# Patient Record
Sex: FEMALE | Race: WHITE | NOT HISPANIC OR LATINO | Employment: FULL TIME | URBAN - METROPOLITAN AREA
[De-identification: names, ages, dates, MRNs, and addresses within clinical notes are randomized per-mention and may not be internally consistent; named-entity substitution may affect disease eponyms.]

---

## 2017-03-21 ENCOUNTER — HOSPITAL ENCOUNTER (OUTPATIENT)
Dept: ONCOLOGY | Facility: CLINIC | Age: 27
Discharge: HOME | End: 2017-03-21
Attending: NURSE PRACTITIONER | Admitting: NURSE PRACTITIONER

## 2017-03-21 LAB
ALBUMIN SERPL-MCNC: 2.7 G/DL (ref 3.5–4.8)
ALBUMIN/GLOB SERPL: 0.7 {RATIO} (ref 1–1.7)
ALP SERPL-CCNC: 45 IU/L (ref 32–91)
ALT SERPL-CCNC: 17 IU/L (ref 14–54)
ANION GAP SERPL CALC-SCNC: 12.9 MMOL/L (ref 10–20)
AST SERPL-CCNC: 23 IU/L (ref 15–41)
BILIRUB SERPL-MCNC: 0.2 MG/DL (ref 0.3–1.2)
BUN SERPL-MCNC: 6 MG/DL (ref 8–20)
BUN/CREAT SERPL: 10 (ref 5.4–26.2)
CALCIUM SERPL-MCNC: 8.1 MG/DL (ref 8.9–10.3)
CHLORIDE SERPL-SCNC: 106 MMOL/L (ref 101–111)
CONV CO2: 18 MMOL/L (ref 22–32)
CONV TOTAL PROTEIN: 6.5 G/DL (ref 6.1–7.9)
CREAT UR-MCNC: 0.6 MG/DL (ref 0.4–1)
GLOBULIN UR ELPH-MCNC: 3.8 G/DL (ref 2.5–3.8)
GLUCOSE SERPL-MCNC: 118 MG/DL (ref 65–99)
MAGNESIUM SERPL-MCNC: 1.9 MG/DL (ref 1.8–2.5)
POTASSIUM SERPL-SCNC: 3.9 MMOL/L (ref 3.6–5.1)
SODIUM SERPL-SCNC: 133 MMOL/L (ref 136–144)

## 2017-03-26 ENCOUNTER — CONVERSION ENCOUNTER (OUTPATIENT)
Dept: URGENT CARE | Facility: CLINIC | Age: 27
End: 2017-03-26

## 2017-06-20 ENCOUNTER — HOSPITAL ENCOUNTER (OUTPATIENT)
Dept: ONCOLOGY | Facility: CLINIC | Age: 27
Discharge: HOME | End: 2017-06-20
Attending: INTERNAL MEDICINE | Admitting: INTERNAL MEDICINE

## 2018-01-23 ENCOUNTER — HOSPITAL ENCOUNTER (OUTPATIENT)
Dept: ONCOLOGY | Facility: HOSPITAL | Age: 28
Discharge: HOME | End: 2018-01-23
Attending: NURSE PRACTITIONER | Admitting: NURSE PRACTITIONER

## 2018-01-23 ENCOUNTER — CLINICAL SUPPORT (OUTPATIENT)
Dept: ONCOLOGY | Facility: HOSPITAL | Age: 28
End: 2018-01-23

## 2018-01-23 ENCOUNTER — HOSPITAL ENCOUNTER (OUTPATIENT)
Dept: ONCOLOGY | Facility: CLINIC | Age: 28
Setting detail: INFUSION SERIES
Discharge: HOME | End: 2018-01-23
Attending: NURSE PRACTITIONER | Admitting: NURSE PRACTITIONER

## 2018-01-23 LAB
ANION GAP SERPL CALC-SCNC: 11.7 MMOL/L (ref 10–20)
BUN SERPL-MCNC: 12 MG/DL (ref 8–20)
BUN/CREAT SERPL: 17.1 (ref 5.4–26.2)
CALCIUM SERPL-MCNC: 9.3 MG/DL (ref 8.9–10.3)
CHLORIDE SERPL-SCNC: 106 MMOL/L (ref 101–111)
CONV CO2: 23 MMOL/L (ref 22–32)
CREAT UR-MCNC: 0.7 MG/DL (ref 0.4–1)
GLUCOSE SERPL-MCNC: 88 MG/DL (ref 65–99)
POTASSIUM SERPL-SCNC: 3.7 MMOL/L (ref 3.6–5.1)
SODIUM SERPL-SCNC: 137 MMOL/L (ref 136–144)

## 2018-01-23 NOTE — PROGRESS NOTES
PATIENTS ONCOLOGY RECORD LOCATED IN [facility]      Subjective     Name:  CARLENE VICTOR     Date:  2018  Address:  [address] [place] [address] [place] [facility]  Home: [phone]  :  1990 AGE:  27 y.o.        RECORDS OBTAINED:  Patients Oncology Record is located in [facility]

## 2018-05-30 ENCOUNTER — HOSPITAL ENCOUNTER (OUTPATIENT)
Dept: ONCOLOGY | Facility: CLINIC | Age: 28
Setting detail: INFUSION SERIES
Discharge: HOME | End: 2018-05-30
Attending: INTERNAL MEDICINE | Admitting: INTERNAL MEDICINE

## 2018-05-30 ENCOUNTER — CLINICAL SUPPORT (OUTPATIENT)
Dept: ONCOLOGY | Facility: HOSPITAL | Age: 28
End: 2018-05-30

## 2018-05-30 NOTE — PROGRESS NOTES
PATIENTS ONCOLOGY RECORD LOCATED IN [facility]      Subjective     Name:  CARLENE VICTOR     Date:  2018  Address:  [address] [place] [address] [place] [facility]  Home: [phone]  :  1990 AGE:  27 y.o.        RECORDS OBTAINED:  Patients Oncology Record is located in [facility]

## 2018-10-07 ENCOUNTER — CONVERSION ENCOUNTER (OUTPATIENT)
Dept: URGENT CARE | Facility: CLINIC | Age: 28
End: 2018-10-07

## 2019-01-29 ENCOUNTER — HOSPITAL ENCOUNTER (OUTPATIENT)
Dept: ONCOLOGY | Facility: HOSPITAL | Age: 29
Discharge: HOME | End: 2019-01-29
Attending: INTERNAL MEDICINE | Admitting: INTERNAL MEDICINE

## 2019-01-29 ENCOUNTER — CLINICAL SUPPORT (OUTPATIENT)
Dept: ONCOLOGY | Facility: HOSPITAL | Age: 29
End: 2019-01-29

## 2019-01-29 ENCOUNTER — HOSPITAL ENCOUNTER (OUTPATIENT)
Dept: ONCOLOGY | Facility: CLINIC | Age: 29
Setting detail: INFUSION SERIES
Discharge: HOME | End: 2019-01-29
Attending: INTERNAL MEDICINE | Admitting: INTERNAL MEDICINE

## 2019-01-29 LAB
ALBUMIN SERPL-MCNC: 4.3 G/DL (ref 3.5–4.8)
ALBUMIN/GLOB SERPL: 1.4 {RATIO} (ref 1–1.7)
ALP SERPL-CCNC: 38 IU/L (ref 32–91)
ALT SERPL-CCNC: 14 IU/L (ref 14–54)
ANION GAP SERPL CALC-SCNC: 10.5 MMOL/L (ref 10–20)
AST SERPL-CCNC: 23 IU/L (ref 15–41)
BILIRUB SERPL-MCNC: 0.5 MG/DL (ref 0.3–1.2)
BUN SERPL-MCNC: 10 MG/DL (ref 8–20)
BUN/CREAT SERPL: 12.5 (ref 5.4–26.2)
CALCIUM SERPL-MCNC: 9.1 MG/DL (ref 8.9–10.3)
CHLORIDE SERPL-SCNC: 108 MMOL/L (ref 101–111)
CONV CO2: 23 MMOL/L (ref 22–32)
CONV TOTAL PROTEIN: 7.3 G/DL (ref 6.1–7.9)
CREAT UR-MCNC: 0.8 MG/DL (ref 0.4–1)
GLOBULIN UR ELPH-MCNC: 3 G/DL (ref 2.5–3.8)
GLUCOSE SERPL-MCNC: 79 MG/DL (ref 65–99)
POTASSIUM SERPL-SCNC: 3.5 MMOL/L (ref 3.6–5.1)
SODIUM SERPL-SCNC: 138 MMOL/L (ref 136–144)

## 2019-01-29 NOTE — PROGRESS NOTES
PATIENTS ONCOLOGY RECORD LOCATED IN [facility]      Subjective     Name:  CARLENE VICTOR     Date:  2019  Address:  [address] [place] [address] [place] [facility]  Home: [unfilled]  :  1990 AGE:  28 y.o.        RECORDS OBTAINED:  Patients Oncology Record is located in [facility]

## 2019-03-20 LAB
EXTERNAL HEPATITIS B SURFACE ANTIGEN: NEGATIVE
EXTERNAL HEPATITIS C AB: NEGATIVE
EXTERNAL RUBELLA QUALITATIVE: NORMAL
EXTERNAL SYPHILIS RPR SCREEN: NORMAL
HIV1 P24 AG SERPL QL IA: NORMAL

## 2019-05-01 ENCOUNTER — HOSPITAL ENCOUNTER (OUTPATIENT)
Dept: GENERAL RADIOLOGY | Facility: HOSPITAL | Age: 29
Discharge: HOME | End: 2019-05-01
Attending: OBSTETRICS & GYNECOLOGY | Admitting: OBSTETRICS & GYNECOLOGY

## 2019-06-04 VITALS
RESPIRATION RATE: 18 BRPM | HEART RATE: 74 BPM | OXYGEN SATURATION: 97 % | OXYGEN SATURATION: 98 % | HEIGHT: 61 IN | WEIGHT: 177 LBS | DIASTOLIC BLOOD PRESSURE: 73 MMHG | HEART RATE: 78 BPM | DIASTOLIC BLOOD PRESSURE: 77 MMHG | SYSTOLIC BLOOD PRESSURE: 119 MMHG | RESPIRATION RATE: 16 BRPM | BODY MASS INDEX: 29.34 KG/M2 | SYSTOLIC BLOOD PRESSURE: 115 MMHG | WEIGHT: 155.4 LBS

## 2019-08-27 ENCOUNTER — TELEPHONE (OUTPATIENT)
Dept: OBSTETRICS AND GYNECOLOGY | Facility: HOSPITAL | Age: 29
End: 2019-08-27

## 2019-08-27 ENCOUNTER — TRANSCRIBE ORDERS (OUTPATIENT)
Dept: ADMINISTRATIVE | Facility: HOSPITAL | Age: 29
End: 2019-08-27

## 2019-08-27 ENCOUNTER — HOSPITAL ENCOUNTER (OUTPATIENT)
Dept: GENERAL RADIOLOGY | Facility: HOSPITAL | Age: 29
Discharge: HOME | End: 2019-08-27
Admitting: OBSTETRICS & GYNECOLOGY

## 2019-08-27 ENCOUNTER — TELEPHONE (OUTPATIENT)
Dept: ONCOLOGY | Facility: CLINIC | Age: 29
End: 2019-08-27

## 2019-08-27 DIAGNOSIS — J06.9 ACUTE UPPER RESPIRATORY INFECTION: Primary | ICD-10-CM

## 2019-08-27 DIAGNOSIS — J06.9 ACUTE UPPER RESPIRATORY INFECTION: ICD-10-CM

## 2019-08-27 PROCEDURE — 71046 X-RAY EXAM CHEST 2 VIEWS: CPT

## 2019-08-27 NOTE — TELEPHONE ENCOUNTER
Ms. Paris left message she needed to schedule follow up appt w/ Dr. Marino.  Returned call, could not leave message as mailbox full.

## 2019-08-27 NOTE — TELEPHONE ENCOUNTER
Called pt to inform her CXR wnl.  See office note from my office for details of today's exam.  Due to cough c more productive sputum over the past few weeks recommend treatment for possible bronchitis.    Rx for Jessica called to [address]    Pt also advised re: other sources of cough/ GI, Cardiac, other pulmonary sources.   Pt voiced understanding, will initiate further workup if abx not effective.

## 2019-08-28 ENCOUNTER — TELEPHONE (OUTPATIENT)
Dept: ENDOCRINOLOGY | Facility: CLINIC | Age: 29
End: 2019-08-28

## 2019-08-28 NOTE — TELEPHONE ENCOUNTER
CALLED AND LM FOR THE PATIENT ABOUT I CAN SCHEDULE HER ON 09/05/2019 AT 8AM ,Thursday  WITH DR SMITH THE SAME DAY AS THE GESTATIONAL CLASS WHICH WILL BE  AT 9AM TO 11AM .THE PATIENT CALLED ME BACK AND CONFRIM SHE WILL BE HERE ON THAT DAY AND TIME.

## 2019-08-28 NOTE — TELEPHONE ENCOUNTER
I CALLED THE PATIENT AND LEFT AN VM ON ON HER CELL TO CALL ME BACK  AND I ALSO CALLED THE HOME NUMBER AND SPOKE WITH  THE PATIENT'S MOM DIMA VICTOR  AND SCHEDULE THE CLASS FOR THE GESTATIONAL CLASS FOR 09/05/2019 AT 9AM -11AM WITH SHIV TORRES AND TOLD HER ABOUT THE  SLOT FOR TODAY FOR DR. FERNANDEZ AT 3:30PM AND TO COME 15 MINS BEFORE HER APPT. SHE SAID SHE WOULD LET HER KNOW ABOUT THE APPT TODAY AND TRY TO MAKE IT. I GAVE  HER MY NUMBER  TO CALL ME JUST IN CASE SHE NEEDS TO RESCHEDULE.

## 2019-08-28 NOTE — TELEPHONE ENCOUNTER
THE PATIENT DID CALL ME BACK AND SHE CAN MAKE THE GESTATIONAL CLASS ON THE 09/05/2019 WITH SHIV TORRES AT 9AM TO 11AM  HOWEVER SHE WILL NOT BE ABLE TO MAKE TODAY'S APPT AT 3:30 PM TODAY WITH DR. FERNANDEZ .THE PATIENT HAS TO WORK LATE SO I TOLD HER I WOULD CALL HER BACK TO SEE IF THERE WERE ANY OPENING BEFORE THE GESTATIONAL CLASS OR AFTER.

## 2019-08-29 ENCOUNTER — TELEPHONE (OUTPATIENT)
Dept: ONCOLOGY | Facility: CLINIC | Age: 29
End: 2019-08-29

## 2019-08-29 NOTE — TELEPHONE ENCOUNTER
Ms. Paris left message she needed to schedule follow up w/ Dr. Marino.  Attempted to return call on number given, but mailbox was full.  Reached patient's mother on home number who had Ms. Paris on the phone.  Scheduled appt for 9/19.

## 2019-09-03 ENCOUNTER — TELEPHONE (OUTPATIENT)
Dept: ONCOLOGY | Facility: CLINIC | Age: 29
End: 2019-09-03

## 2019-09-04 ENCOUNTER — TELEPHONE (OUTPATIENT)
Dept: ENDOCRINOLOGY | Facility: CLINIC | Age: 29
End: 2019-09-04

## 2019-09-04 NOTE — TELEPHONE ENCOUNTER
I CALLED AND TALKED TO THE PATIENT AND REMINDED HER OF HER APPT TOMORROW AT 8:00AM WITH DR. SMITH AND TO ARRIVE 15 MIN BEFORE HER APPT.

## 2019-09-05 ENCOUNTER — OFFICE VISIT (OUTPATIENT)
Dept: ENDOCRINOLOGY | Facility: CLINIC | Age: 29
End: 2019-09-05

## 2019-09-05 ENCOUNTER — LAB (OUTPATIENT)
Dept: LAB | Facility: HOSPITAL | Age: 29
End: 2019-09-05

## 2019-09-05 VITALS
HEART RATE: 85 BPM | SYSTOLIC BLOOD PRESSURE: 130 MMHG | BODY MASS INDEX: 36.25 KG/M2 | OXYGEN SATURATION: 98 % | DIASTOLIC BLOOD PRESSURE: 82 MMHG | HEIGHT: 61 IN | WEIGHT: 192 LBS

## 2019-09-05 DIAGNOSIS — O24.410 DIET CONTROLLED GESTATIONAL DIABETES MELLITUS (GDM) IN THIRD TRIMESTER: ICD-10-CM

## 2019-09-05 DIAGNOSIS — O24.410 DIET CONTROLLED GESTATIONAL DIABETES MELLITUS (GDM) IN THIRD TRIMESTER: Primary | ICD-10-CM

## 2019-09-05 LAB — HBA1C MFR BLD: 5.8 % (ref 3.5–5.6)

## 2019-09-05 PROCEDURE — 36415 COLL VENOUS BLD VENIPUNCTURE: CPT

## 2019-09-05 PROCEDURE — 83036 HEMOGLOBIN GLYCOSYLATED A1C: CPT

## 2019-09-05 PROCEDURE — 99244 OFF/OP CNSLTJ NEW/EST MOD 40: CPT | Performed by: INTERNAL MEDICINE

## 2019-09-05 PROCEDURE — G0108 DIAB MANAGE TRN  PER INDIV: HCPCS | Performed by: DIETITIAN, REGISTERED

## 2019-09-05 RX ORDER — GUAIFENESIN 600 MG/1
1200 TABLET, EXTENDED RELEASE ORAL 2 TIMES DAILY
COMMUNITY
End: 2019-09-26

## 2019-09-05 RX ORDER — LANCETS 28 GAUGE
EACH MISCELLANEOUS
Qty: 300 EACH | Refills: 1 | Status: SHIPPED | OUTPATIENT
Start: 2019-09-05 | End: 2019-11-20

## 2019-09-05 RX ORDER — AMOXICILLIN AND CLAVULANATE POTASSIUM 875; 125 MG/1; MG/1
TABLET, FILM COATED ORAL
COMMUNITY
Start: 2019-09-04 | End: 2019-09-19

## 2019-09-05 RX ORDER — LANCETS 28 GAUGE
EACH MISCELLANEOUS
Qty: 60 EACH | Refills: 5 | Status: SHIPPED | OUTPATIENT
Start: 2019-09-05 | End: 2019-09-05 | Stop reason: SDUPTHER

## 2019-09-05 RX ORDER — BLOOD-GLUCOSE METER
KIT MISCELLANEOUS
Qty: 1 EACH | Refills: 0 | Status: SHIPPED | OUTPATIENT
Start: 2019-09-05 | End: 2019-11-20

## 2019-09-05 RX ORDER — CALCIUM CARBONATE 200(500)MG
1 TABLET,CHEWABLE ORAL DAILY
COMMUNITY
End: 2019-11-01 | Stop reason: HOSPADM

## 2019-09-05 RX ORDER — CODEINE PHOSPHATE AND GUAIFENESIN 10; 100 MG/5ML; MG/5ML
SOLUTION ORAL
Refills: 0 | COMMUNITY
Start: 2019-08-27 | End: 2019-09-05

## 2019-09-05 RX ORDER — AZITHROMYCIN 250 MG/1
TABLET, FILM COATED ORAL
Refills: 0 | COMMUNITY
Start: 2019-08-27 | End: 2019-09-05

## 2019-09-05 NOTE — PROGRESS NOTES
Endocrine Consult Outpatient  Referred by Dr. Oliver for gestational diabetes consultation  Patient Care Team:  Candice Echols APRN as PCP - General  Candice Echols APRN as PCP - Family Medicine     Chief Complaint: Stational diabetes    HPI: 28-year-old female with no significant past medical history, who is currently 31-week pregnant was found to have gestational diabetes in this pregnancy after she failed her initial 1 hour glucose screen and and subsequently underwent 3-hour glucose tolerance test which was abnormal and she is referred here for further evaluation.  She tells me that this is her third pregnancy, her first pregnancy she had a healthy baby at 7 pounds and 1 hours and she did not have gestational diabetes and that pregnancy.  Second pregnancy was ended up in a miscarriage at 8 weeks and this is her third pregnancy at 31-week and she has gained about 35 pounds of weight.  Since she had 3-hour glucose tolerance test which was done on August 22, 2019, she has been working on her diet and has maintained her weight.  She does not have a glucometer therefore she is not checking blood sugars at this time.  She is a scheduled to go through gestational diabetes class later this day.    Old records reviewed: 3-hour glucose tolerance test done on August 22, 2019 reviewed showed a fasting glucose of 109, 1 hour glucose was 221, 2-hour glucose was 1/2/2015 and 3-hour glucose was 144.    Past Medical History:   Diagnosis Date   • Gestational diabetes    • Neutropenia (CMS/[facility])        Social History     Socioeconomic History   • Marital status:      Spouse name: Not on file   • Number of children: Not on file   • Years of education: Not on file   • Highest education level: Not on file   Tobacco Use   • Smoking status: Never Smoker   Substance and Sexual Activity   • Alcohol use: No     Frequency: Never       Family History   Problem Relation Age of Onset   • Diabetes Mother    • Diabetes Maternal  Uncle        Allergies no known allergies    ROS:   Constitutional:  Admit fatigue, tiredness.    Eyes:  Denies change in visual acuity   HENT:  Denies nasal congestion or sore throat   Respiratory: denies cough, admit shortness of breath.   Cardiovascular:  denies chest pain, edema   GI:  Denies abdominal pain, admit nausea, denies vomiting.   :  Denies dysuria   Musculoskeletal:  Denies back pain or joint pain, admit muscle aches  Integument:  Denies dry skin, rash   Neurologic:  Denies headache, focal weakness or sensory changes   Endocrine:  Admit  polyuria and  polydipsia   Psychiatric:  Admit depression and anxiety      Vitals:    09/05/19 0813   BP: 130/82   Pulse: 85   SpO2: 98%        Physical Exam:  GEN: NAD, conversant, Obese  EYES: EOMI, PERRL, no conjunctival erythema  NECK: no thyromegaly, full ROM   CV: RRR, no murmurs/rubs/gallops, no peripheral edema  LUNG: CTAB, no wheezes/rales/ronchi  SKIN: no rashes, no acanthosis  MSK: no deformities, full ROM of all extremities  NEURO: no tremors, DTR normal  PSYCH: AOX3, appropriate mood, affect normal      Results Review:     I reviewed the patient's new clinical results.    Lab Results   Component Value Date    GLUCOSE 79 01/29/2019    BUN 10 01/29/2019    CREATININE 0.8 01/29/2019    BCR 12.5 01/29/2019    K 3.5 (L) 01/29/2019    CO2 23 01/29/2019    CALCIUM 9.1 01/29/2019    ALBUMIN 4.3 01/29/2019    LABIL2 1.4 01/29/2019    AST 23 01/29/2019    ALT 14 01/29/2019     No results found for: HGBA1C  Lab Results   Component Value Date    CREATININE 0.8 01/29/2019     Medication Review: Reviewed.       Current Outpatient Medications:   •  amoxicillin-clavulanate (AUGMENTIN) 875-125 MG per tablet, , Disp: , Rfl:   •  calcium carbonate (TUMS) 500 MG chewable tablet, Chew 1 tablet Daily., Disp: , Rfl:   •  guaiFENesin (MUCINEX) 600 MG 12 hr tablet, Take 1,200 mg by mouth 2 (Two) Times a Day., Disp: , Rfl:     Assessment/Plan   Gestational diabetes mellitus:  "Currently 31-week pregnant.  She is a scheduled for gestational class later today, I have asked her to check blood sugar fasting and 2-hour postprandial, fasting target is less than 90 and 2-hour postprandial target is less than 120.  If she is not able to control her gestational diabetes with diet in the next 1 to 2 weeks then we will consider adding insulin.  She is also advised to check urine ketones in the morning and send all the data on a weekly basis for review.  I will check the A1c.  We will send a glucometer.  See her back in couple weeks.             Telly Gutierrez MD FACE.  06/15/19  4:34 PM      EMR Dragon / transcription disclaimer:     \"Dictated utilizing Dragon dictation\".               "

## 2019-09-05 NOTE — PATIENT INSTRUCTIONS
Please check your blood sugar fasting and 2-hour postprandial and send me the data every week  Please check urine ketones every morning and send with a day to every week  Please check A1c today  Please finish gestational diabetes education today.  Follow-up in 2 weeks.

## 2019-09-13 ENCOUNTER — TELEPHONE (OUTPATIENT)
Dept: ENDOCRINOLOGY | Facility: CLINIC | Age: 29
End: 2019-09-13

## 2019-09-13 RX ORDER — PEN NEEDLE, DIABETIC 30 GX3/16"
1 NEEDLE, DISPOSABLE MISCELLANEOUS NIGHTLY
Qty: 100 EACH | Refills: 1 | Status: SHIPPED | OUTPATIENT
Start: 2019-09-13 | End: 2019-11-20

## 2019-09-13 NOTE — TELEPHONE ENCOUNTER
Spoke with pt on the phone and she is willing to start Levemir tonight 10 units sq and send BG next week. Rx sent

## 2019-09-16 ENCOUNTER — TELEPHONE (OUTPATIENT)
Dept: ONCOLOGY | Facility: CLINIC | Age: 29
End: 2019-09-16

## 2019-09-16 NOTE — TELEPHONE ENCOUNTER
NO RESULT FOR PATIENT FOUND AT [facility]. REFER TO ORDER 1.29.19 FOR REFERRAL IN THE SPRING.

## 2019-09-17 PROBLEM — D70.9 CHRONIC NEUTROPENIA (HCC): Status: ACTIVE | Noted: 2019-09-17

## 2019-09-17 RX ORDER — BLOOD-GLUCOSE METER
KIT MISCELLANEOUS
Refills: 0 | COMMUNITY
Start: 2019-09-05 | End: 2019-10-24 | Stop reason: SDUPTHER

## 2019-09-17 NOTE — PROGRESS NOTES
Hematology/Oncology Outpatient Follow Up    PATIENT NAME:hSikha Paris  :1990  MRN: [id number]  PRIMARY CARE PHYSICIAN: Candice Echols APRN  REFERRING PHYSICIAN: Candice Echols APRN    Chief Complaint   Patient presents with   • Follow-up     for chronic neutropenia      HISTORY OF PRESENT ILLNESS:   1. Chronic neutropenia diagnosis established originally in .  • She claimed to have developed a sore throat and fevers five days prior to her admission at [facility] on 3/15/14.  Her temperature went up to a maximum of 103.9 and she called Dr. Wetzel about this and was told to go to the emergency room if the fever went up again, which she did.  She had preliminary work-up done and was sent home.  Her temperature, however, on day of admission went up to 102 degrees.  She called Dr. Wetzel and was admitted to the hospital.  She claimed to have had a few blisters come up on her lips and the inside of her mouth a few days prior to admission.  She had developed a dry cough which caused her to vomit.  She was found to have a white blood cell count of 3.7 with 22% segs and infectious disease consult was obtained.  She was treated with antibiotics and antivirals and hematology consultation was called.  The patient stated that she was originally diagnosed with neutropenia in  when she was in the eighth grade.  She was living in [place] at that time and was worked up at [facility].  She had a bone marrow done on 04 which was normocellular containing trilateral maturation, rare iron stores, no abnormal infiltrates identified.  Flow cytometry revealed blast marker CD34 seen in 9% of gated cells and 5-6% of the total cells.  Bone marrow was repeated on 3/4/04 which revealed hypocellular marrow with erythroid predominance, 6.7% blasts, decreased neutrophils, eosinophilia and mild dyspoiesis changes of the myeloid and erythroid series.  Iron stores were absent.  The bone marrow was repeated  on 3/29/04 which revealed diluted, markedly hypocellular marrow aspirate.  Bone marrow biopsy showed mildly hypocellular marrow with adequate erythropoiesis and megakaryocytes, mild monocytes and relative eosinophilia but marked granulocytic hypoplasia.  Flow cytometry revealed a mixture of myeloid and lymphoid cells.  Fewer than 2% or cells expressed CD 34.  She was hospitalized at [facility] in July of 2009 and evaluated by Dr. Shaheen Ray.  A bone marrow biopsy was performed which revealed normal appearing megakaryocytes, evidence of normoblastic crythropoiesis and evidence of some myeloid maturation with left shift.  Flow cytometry was negative.  Cytogenetics was normal.  Peripheral blood flow cytometry was also performed which was normal.  • 3/11/4 - Chest x-ray negative.  • 3/12/14 - Flu screen negative.  • 3/15/14 - Chest x-ray revealed asymmetric abnormal small focal infiltrate in the posterior aspect of the right upper lobe of the lung suspicious for pneumonia.  • 3/16/14 - IgM 112 (N), IgA 178 (N), IgG 953 (N), EBV IgM negative, EBV IgG positive, CMV IgG normal, CMV IgM 2 (H).  • 3/17/14 - Peripheral blood flow cytometry normal.  • 4/1/14 - CXR: Complete clearing of the right upper lobe pneumonia.  The chest is return to normal.  Significant improvement from March 15, 2014.  • 4/8/14 - WBC 2.83, ANC 0.26.  • 4/8/14 - CMV IgG 0.1 (N), CMV IgM 0.42 (N).  • 7/15/14 - WBC 3.27, ANC 0.45. Comprehensive metabolic panel with serum creatinine 1.7 (0.6-1.2).   • 1/28/15 - Immunoglobulins normal. Serum creatinine 1.1 mg/dL.   • 9/24/15 - WBC 3.1 with 31% granulocytes, 51% lymphocytes, 18% monocytes, hemoglobin 12.7, platelet count 293,000. Patient has had redness to the right hip area with some pus and painful lymph node in the right groin for two days, taking Keflex through PMD. Neupogen 300 mcg subcu daily and Cipro 500 mg p.o. b.i.d. prescribed.   • 9/25/15 - Wound culture grew 2+ staphylococcus aureus MRSA susceptible  to Tetracycline, Bactrium, Vancomycin. Folate more than 24.8 (5.9-24.8). AGA negative. Vitamin B12 of 263 (180-914).   • 9/28/15 - Hip drainage culture came back positive for MRSA and Keflex and Cipro stopped. Patient started on Bactrim DS 1 p.o. q. 12 hours x14 days.   • 9/30/15 - Patient complained of sharp chest pains. EKG done revealing sinus rhythm with sinus arrhythmia. WBC increased to 9000 with Neupogen discontinued.   • 10/7/15 - WBC 2.8 with 22% granulocytes, 70% lymphocytes, 9% monocytes, hemoglobin 12.4, platelet count 213,000. Resolution of hyperemia and no more drainage from the right hip wound. Chest pains have decreased and are associated with exercise. IgA 165 (), IgG 1100 (600-1500), IgM 117 (). Chest x-ray with no acute cardiopulmonary abnormality.  (). Antineutrophil antibodies negative.   • 12/8/15 - WBC 3.1 with 34% granulocytes, 56% lymphocytes, 10% monocytes, hemoglobin 13, MCV 93.7, platelet count 258,000. Comprehensive metabolic panel with no significant abnormality. ARON screen negative.   • 12/10/15 - CT chest PE protocol with no acute cardiopulmonary abnormality.   • 2/8/16 - WBC 4 with 33% granulocytes, 55% lymphocytes, hemoglobin 13.3, platelet count 653,000. Patient claims to have had recurrent infections with upper respiratory and vaginal infections and is just finishing a course of Flagyl at present. Asked to start on Neupogen 450 mcg subq weekly x4 any time she gets an infection. HSV which showed herpes 1 antibody IgG >8.0 high (<0.9), herpes 2 antibody IgG <0.2 normal (<0.9), herpes simplex1/2 IgM <0.9 normal (<0.9).      • 6/28/16 - White count 4.02, hemoglobin 12.2, MCV 90.7 and platelet count 320,000. ANC is 570.   • 7/12/16 - Patient called the office reporting a skin infection, treated with Ciprofloxacin.   • 9/27/16 - Comprehensive metabolic panel with no significant abnormality. UA negative. Zika virus IgM negative and Zika RNA PCR IgM not detected.  Blood cultures with no growth. CMV IgM 0.27 (<0.91) and CMV IgG 0.1 (<0.9). IgA 167 (), IgG 1090 (600-1500), IgM 128 ().        • 10/4/16 - Blood typing was A positive. RPR non-reactive. Hepatitis B surface antigen non-reactive.   • 11/28/16 - Patient seen by Shiloh Oliver M.D. for second trimester of first pregnancy.   • 12/22/16 - Notified to increase potassium in her diet.   • 3/20/17 - Seen in ER at [facility] for nausea, vomiting and diarrhea. Treated with 1 liter lactated Ringer’s and Zofran. UA suspicious for UTI.  • 3/21/17 - White blood cell count 3.7, ANC 0.94, hemoglobin 12.2, MCV 94.9 and platelet count 249,000. Magnesium 1.9 (1.8-2.5), potassium 3.9 (3.6-5.1), sodium 133 (136-144).      • 6/20/17 - WBC 3.1 with 9% neutrophils, 61% lymphocytes, 24% monocytes, 5% eosinophils, hemoglobin 12.9, platelet count 275,000. Flow cytometry on peripheral blood with no evidence of abnormal myeloid maturation or an increased blast population. No evidence of lymphoproliferative disorder.   • 8/2/17 - Reviewed medication list for side effects causing neutropenia.  Pepcid AC can cause pancytopenia, and leukopenia. Valtrex can cause a decreased neutrophil count.   • 1/29/19 - Discussed possible second opinion at the [facility].   • 8/27/19 - Chest x-ray showed no active disease.  • 9/19/2019 patient 33 weeks pregnant.  Has not made her trip to the [facility] yet.  WBC 3.84 with 20% neutrophils, 53% lymphocytes, 25% monocytes, hemoglobin 10.5, MCV 88.9, platelet count 299,000.    Past Medical History:   Diagnosis Date   • Gestational diabetes    • Neutropenia (CMS/HCC)        Past Surgical History:   Procedure Laterality Date   • TONSILLECTOMY AND ADENOIDECTOMY  1998         Current Outpatient Medications:   •  acetone, urine, test strip, Check urine ketones every day in the morning, Disp: 50 strip, Rfl: 3  •  Blood Glucose Monitoring Suppl (FREESTYLE FREEDOM LITE) w/Device kit, Use as directed  to check glucose, Disp: 1 each, Rfl: 0  •  Blood Glucose Monitoring Suppl (FREESTYLE LITE) device, USE AS DIRECTED TO CHECK GLUCOSE, Disp: , Rfl: 0  •  calcium carbonate (TUMS) 500 MG chewable tablet, Chew 1 tablet Daily., Disp: , Rfl:   •  glucose blood (FREESTYLE TEST STRIPS) test strip, PRN, Disp: 60 each, Rfl: 5  •  guaiFENesin (MUCINEX) 600 MG 12 hr tablet, Take 1,200 mg by mouth 2 (Two) Times a Day., Disp: , Rfl:   •  insulin detemir (LEVEMIR FLEXTOUCH) 100 UNIT/ML injection, Inject 10 Units under the skin into the appropriate area as directed Every Night., Disp: 15 mL, Rfl: 2  •  Insulin Pen Needle (PEN NEEDLES) 32G X 4 MM misc, 1 each Every Night. Use with insulin pen nightly, Disp: 100 each, Rfl: 1  •  Lancets (FREESTYLE) lancets, USE TO TEST BLOOD SUGAR AS NEEDED, Disp: 300 each, Rfl: 1    No Known Allergies    Family History   Problem Relation Age of Onset   • Diabetes Mother    • Diabetes Maternal Uncle        Cancer-related family history is not on file.    Social History     Tobacco Use   • Smoking status: Never Smoker   Substance Use Topics   • Alcohol use: No     Frequency: Never   • Drug use: Not on file       I have reviewed the history of present illness, past medical history, family history, social history, lab results, all notes and other records since the patient was last seen on 1/29/19.     SUBJECTIVE: Patient is here for follow up of chronic neutropenia. Reports to having a chronic cough and will get bronchitis every six to eight months. Has appointment with ENT. Denies any fevers. Is 33 weeks pregnant and is seeing Dr. Shiloh Oliver. Never went to [facility]. Plans on trying to go during her maternity leave or in the Spring.      LISET Willson present during office visit.       REVIEW OF SYSTEMS:  Review of Systems   Constitutional: Negative for chills and fever.   HENT: Negative for ear pain, mouth sores, nosebleeds and sore throat.    Eyes: Negative for photophobia and visual  "disturbance.   Respiratory: Positive for cough. Negative for wheezing and stridor.    Cardiovascular: Negative for chest pain and palpitations.   Gastrointestinal: Negative for abdominal pain, diarrhea, nausea and vomiting.   Endocrine: Negative for cold intolerance and heat intolerance.   Genitourinary: Negative for dysuria and hematuria.   Musculoskeletal: Negative for joint swelling and neck stiffness.   Skin: Negative for color change and rash.   Neurological: Negative for seizures and syncope.   Hematological: Negative for adenopathy.        No obvious bleeding   Psychiatric/Behavioral: Negative for agitation, confusion and hallucinations.       OBJECTIVE:    Vitals:    09/19/19 1534   BP: 95/62  Comment: Pt states that her BP is usually this low   Pulse: 80   Resp: 16   Temp: 98.5 °F (36.9 °C)   Weight: 86.8 kg (191 lb 6.4 oz)   Height: 154.9 cm (61\")   PainSc:   2   PainLoc: Abdomen  Comment: Patient states she's having [place]       ECOG  (0) Fully active, able to carry on all predisease performance without restriction    Physical Exam   Constitutional: She is oriented to person, place, and time. No distress.   HENT:   Head: Normocephalic and atraumatic.   Dental fillings.    Eyes: Conjunctivae and EOM are normal. Right eye exhibits no discharge. Left eye exhibits no discharge. No scleral icterus.   Neck: Normal range of motion. Neck supple. No thyromegaly present.   Cardiovascular: Normal rate, regular rhythm and normal heart sounds. Exam reveals no gallop and no friction rub.   Pulmonary/Chest: Effort normal. No stridor. No respiratory distress. She has no wheezes.   Abdominal: Soft. Bowel sounds are normal. She exhibits no mass. There is no tenderness. There is no rebound and no guarding.   Musculoskeletal: Normal range of motion. She exhibits no tenderness.   Lymphadenopathy:     She has no cervical adenopathy.   Neurological: She is alert and oriented to person, place, and time. She exhibits " normal muscle tone.   Skin: Skin is warm. No rash noted. She is not diaphoretic. No erythema.   Psychiatric: She has a normal mood and affect. Her behavior is normal.   Nursing note and vitals reviewed.      RECENT LABS  WBC   Date Value Ref Range Status   09/19/2019 3.84 3.40 - 10.80 10*3/mm3 Final     RBC   Date Value Ref Range Status   09/19/2019 3.61 (L) 3.77 - 5.28 10*6/mm3 Final     Hemoglobin   Date Value Ref Range Status   09/19/2019 10.5 (L) 12.0 - 15.9 g/dL Final     Hematocrit   Date Value Ref Range Status   09/19/2019 32.1 (L) 34.0 - 46.6 % Final     MCV   Date Value Ref Range Status   09/19/2019 88.9 79.0 - 97.0 fL Final     MCH   Date Value Ref Range Status   09/19/2019 29.1 26.6 - 33.0 pg Final     MCHC   Date Value Ref Range Status   09/19/2019 32.7 31.5 - 35.7 g/dL Final     RDW   Date Value Ref Range Status   09/19/2019 13.2 12.3 - 15.4 % Final     RDW-SD   Date Value Ref Range Status   09/19/2019 41.0 37.0 - 54.0 fl Final     MPV   Date Value Ref Range Status   09/19/2019 8.3 6.0 - 12.0 fL Final     Platelets   Date Value Ref Range Status   09/19/2019 299 140 - 450 10*3/mm3 Final     Neutrophil %   Date Value Ref Range Status   09/19/2019 19.8 (L) 42.7 - 76.0 % Final     Lymphocyte %   Date Value Ref Range Status   09/19/2019 53.4 (H) 19.6 - 45.3 % Final     Monocyte %   Date Value Ref Range Status   09/19/2019 24.7 (H) 5.0 - 12.0 % Final     Eosinophil %   Date Value Ref Range Status   09/19/2019 1.8 0.3 - 6.2 % Final     Basophil %   Date Value Ref Range Status   09/19/2019 0.3 0.0 - 1.5 % Final     Neutrophils, Absolute   Date Value Ref Range Status   09/19/2019 0.76 (L) 1.70 - 7.00 10*3/mm3 Final     Lymphocytes, Absolute   Date Value Ref Range Status   09/19/2019 2.05 0.70 - 3.10 10*3/mm3 Final     Monocytes, Absolute   Date Value Ref Range Status   09/19/2019 0.95 (H) 0.10 - 0.90 10*3/mm3 Final     Eosinophils, Absolute   Date Value Ref Range Status   09/19/2019 0.07 0.00 - 0.40 10*3/mm3  Final     Basophils, Absolute   Date Value Ref Range Status   09/19/2019 0.01 0.00 - 0.20 10*3/mm3 Final     nRBC   Date Value Ref Range Status   05/20/2017 0 0 /100[WBCs] Final       Lab Results   Component Value Date    GLUCOSE 79 01/29/2019    BUN 10 01/29/2019    CREATININE 0.8 01/29/2019    BCR 12.5 01/29/2019    K 3.5 (L) 01/29/2019    CO2 23 01/29/2019    CALCIUM 9.1 01/29/2019    ALBUMIN 4.3 01/29/2019    LABIL2 1.4 01/29/2019    AST 23 01/29/2019    ALT 14 01/29/2019         Assessment/Plan     Chronic neutropenia (CMS/HCC)  - CBC & Differential  - CBC Auto Differential  - IgG  - IgM  - IgA    Anemia, unspecified type  - Ferritin  - Folate  - Haptoglobin  - Iron Profile  - Reticulocytes  - Vitamin B12      ASSESSMENT:  Patient returns to the clinic today after 9 months.  She is 33 weeks pregnant.  She has not been to the [facility].  She is complaining of chronic cough and has had a recent negative chest x-ray.  She has an appointment with ENT and is currently taking insulin for gestational diabetes and is taking Zyrtec.  There are plans to start her on Pepcid and singular by her OB, Shiloh Oliver MD.  She is on prenatals but has become anemic.      PLAN:  Check iron profile, ferritin, retic count, folate, Vitamin B12 and haptoglobin today.    Immunoglobulins.         I have reviewed lab results, imaging, vitals, and medications with the patient today. Will follow up in 1 month.     Patient verbalized understanding and is in agreement of the above plan.    I have reviewed and agree with the above information.   Neftaly Marino M.D., F.A.C.P.    Much of the above report is an electronic transcription/translation of the spoken language to printed text using Dragon Software. As such, the subtleties and finesse of the spoken language may permit erroneous, or at times, nonsensical words or phrases to be inadvertently transcribed; thus changes may be made at a later date to rectify these errors.

## 2019-09-18 DIAGNOSIS — D70.9 CHRONIC NEUTROPENIA (HCC): Primary | ICD-10-CM

## 2019-09-19 ENCOUNTER — OFFICE VISIT (OUTPATIENT)
Dept: ONCOLOGY | Facility: CLINIC | Age: 29
End: 2019-09-19

## 2019-09-19 ENCOUNTER — APPOINTMENT (OUTPATIENT)
Dept: LAB | Facility: HOSPITAL | Age: 29
End: 2019-09-19

## 2019-09-19 VITALS
SYSTOLIC BLOOD PRESSURE: 95 MMHG | TEMPERATURE: 98.5 F | BODY MASS INDEX: 36.14 KG/M2 | DIASTOLIC BLOOD PRESSURE: 62 MMHG | HEIGHT: 61 IN | WEIGHT: 191.4 LBS | HEART RATE: 80 BPM | RESPIRATION RATE: 16 BRPM

## 2019-09-19 DIAGNOSIS — D70.9 CHRONIC NEUTROPENIA (HCC): Primary | ICD-10-CM

## 2019-09-19 DIAGNOSIS — D64.9 ANEMIA, UNSPECIFIED TYPE: ICD-10-CM

## 2019-09-19 LAB
BASOPHILS # BLD AUTO: 0.01 10*3/MM3 (ref 0–0.2)
BASOPHILS NFR BLD AUTO: 0.3 % (ref 0–1.5)
DEPRECATED RDW RBC AUTO: 41 FL (ref 37–54)
EOSINOPHIL # BLD AUTO: 0.07 10*3/MM3 (ref 0–0.4)
EOSINOPHIL NFR BLD AUTO: 1.8 % (ref 0.3–6.2)
ERYTHROCYTE [DISTWIDTH] IN BLOOD BY AUTOMATED COUNT: 13.2 % (ref 12.3–15.4)
FERRITIN SERPL-MCNC: 7 NG/ML (ref 11–307)
FOLATE SERPL-MCNC: 16.5 NG/ML (ref 5.9–24.8)
HCT VFR BLD AUTO: 32.1 % (ref 34–46.6)
HGB BLD-MCNC: 10.5 G/DL (ref 12–15.9)
IRON 24H UR-MRATE: 31 MCG/DL (ref 28–170)
IRON SATN MFR SERPL: 6 % (ref 15–50)
LYMPHOCYTES # BLD AUTO: 2.05 10*3/MM3 (ref 0.7–3.1)
LYMPHOCYTES NFR BLD AUTO: 53.4 % (ref 19.6–45.3)
MCH RBC QN AUTO: 29.1 PG (ref 26.6–33)
MCHC RBC AUTO-ENTMCNC: 32.7 G/DL (ref 31.5–35.7)
MCV RBC AUTO: 88.9 FL (ref 79–97)
MONOCYTES # BLD AUTO: 0.95 10*3/MM3 (ref 0.1–0.9)
MONOCYTES NFR BLD AUTO: 24.7 % (ref 5–12)
NEUTROPHILS # BLD AUTO: 0.76 10*3/MM3 (ref 1.7–7)
NEUTROPHILS NFR BLD AUTO: 19.8 % (ref 42.7–76)
PLATELET # BLD AUTO: 299 10*3/MM3 (ref 140–450)
PMV BLD AUTO: 8.3 FL (ref 6–12)
RBC # BLD AUTO: 3.61 10*6/MM3 (ref 3.77–5.28)
RETICS # AUTO: 0.07 10*6/MM3 (ref 0.02–0.13)
RETICS/RBC NFR AUTO: 1.91 % (ref 0.7–1.9)
TIBC SERPL-MCNC: 542 MCG/DL (ref 228–428)
TRANSFERRIN SERPL-MCNC: 387 MG/DL (ref 190–380)
VIT B12 BLD-MCNC: 139 PG/ML (ref 180–914)
WBC NRBC COR # BLD: 3.84 10*3/MM3 (ref 3.4–10.8)

## 2019-09-19 PROCEDURE — 82607 VITAMIN B-12: CPT | Performed by: INTERNAL MEDICINE

## 2019-09-19 PROCEDURE — 99214 OFFICE O/P EST MOD 30 MIN: CPT | Performed by: INTERNAL MEDICINE

## 2019-09-19 PROCEDURE — 85025 COMPLETE CBC W/AUTO DIFF WBC: CPT | Performed by: INTERNAL MEDICINE

## 2019-09-19 PROCEDURE — 84466 ASSAY OF TRANSFERRIN: CPT | Performed by: INTERNAL MEDICINE

## 2019-09-19 PROCEDURE — 85045 AUTOMATED RETICULOCYTE COUNT: CPT | Performed by: INTERNAL MEDICINE

## 2019-09-19 PROCEDURE — 83010 ASSAY OF HAPTOGLOBIN QUANT: CPT | Performed by: INTERNAL MEDICINE

## 2019-09-19 PROCEDURE — 82728 ASSAY OF FERRITIN: CPT | Performed by: INTERNAL MEDICINE

## 2019-09-19 PROCEDURE — 82746 ASSAY OF FOLIC ACID SERUM: CPT | Performed by: INTERNAL MEDICINE

## 2019-09-19 PROCEDURE — 36415 COLL VENOUS BLD VENIPUNCTURE: CPT | Performed by: INTERNAL MEDICINE

## 2019-09-19 PROCEDURE — 83540 ASSAY OF IRON: CPT | Performed by: INTERNAL MEDICINE

## 2019-09-19 PROCEDURE — 82784 ASSAY IGA/IGD/IGG/IGM EACH: CPT | Performed by: INTERNAL MEDICINE

## 2019-09-20 LAB
HAPTOGLOB SERPL-MCNC: 125 MG/DL (ref 36–195)
IGA1 MFR SER: 163 MG/DL (ref 50–400)
IGG1 SER-MCNC: 877 MG/DL (ref 600–1500)
IGM SERPL-MCNC: 105 MG/DL (ref 40–300)

## 2019-09-20 RX ORDER — INSULIN DETEMIR 100 [IU]/ML
INJECTION, SOLUTION SUBCUTANEOUS
Qty: 2 PEN | Refills: 2 | Status: SHIPPED | OUTPATIENT
Start: 2019-09-20 | End: 2019-10-03

## 2019-09-23 ENCOUNTER — TELEPHONE (OUTPATIENT)
Dept: ONCOLOGY | Facility: CLINIC | Age: 29
End: 2019-09-23

## 2019-09-23 DIAGNOSIS — E61.1 IRON DEFICIENCY: ICD-10-CM

## 2019-09-23 DIAGNOSIS — E53.8 B12 DEFICIENCY: Primary | ICD-10-CM

## 2019-09-23 RX ORDER — FERROUS SULFATE 325(65) MG
325 TABLET ORAL 2 TIMES DAILY
Qty: 60 TABLET | Refills: 3 | Status: SHIPPED | OUTPATIENT
Start: 2019-09-23 | End: 2019-11-01 | Stop reason: HOSPADM

## 2019-09-23 NOTE — TELEPHONE ENCOUNTER
Patient returned call.  Discussed lab results and orders.  Patient v/u.  Advised that I would have someone call to schedule her injections and that the iron has been sent to her pharmacy.  Patient v/u.  She stated that she will clear the injections with her OB prior to starting.

## 2019-09-23 NOTE — TELEPHONE ENCOUNTER
----- Message from Neftaly Marino MD sent at 9/20/2019 10:03 AM EDT -----  Vitamin B12 level 139.  Antiparietal cell and intrinsic factor antibodies ordered and vitamin B12 1000 mcg IM weekly x8 followed by monthly ordered.

## 2019-09-25 ENCOUNTER — TELEPHONE (OUTPATIENT)
Dept: ENDOCRINOLOGY | Facility: CLINIC | Age: 29
End: 2019-09-25

## 2019-09-26 ENCOUNTER — OFFICE VISIT (OUTPATIENT)
Dept: ENDOCRINOLOGY | Facility: CLINIC | Age: 29
End: 2019-09-26

## 2019-09-26 VITALS
OXYGEN SATURATION: 97 % | HEIGHT: 62 IN | HEART RATE: 89 BPM | BODY MASS INDEX: 34.96 KG/M2 | SYSTOLIC BLOOD PRESSURE: 118 MMHG | DIASTOLIC BLOOD PRESSURE: 65 MMHG | WEIGHT: 190 LBS

## 2019-09-26 DIAGNOSIS — E66.09 CLASS 1 OBESITY DUE TO EXCESS CALORIES WITHOUT SERIOUS COMORBIDITY WITH BODY MASS INDEX (BMI) OF 34.0 TO 34.9 IN ADULT: ICD-10-CM

## 2019-09-26 DIAGNOSIS — O24.414 INSULIN CONTROLLED GESTATIONAL DIABETES MELLITUS (GDM) IN THIRD TRIMESTER: Primary | ICD-10-CM

## 2019-09-26 PROCEDURE — 99213 OFFICE O/P EST LOW 20 MIN: CPT | Performed by: INTERNAL MEDICINE

## 2019-09-26 NOTE — PATIENT INSTRUCTIONS
Continue Levemir 11 units at bedtime  Please send blood sugar records on a weekly basis for review  Please check A1c in 2 weeks and follow-up in 4 weeks.

## 2019-09-26 NOTE — PROGRESS NOTES
Endocrine Progress Note Outpatient     Patient Care Team:  Candice Echols APRN as PCP - General  Candice Echols APRN as PCP - Family Medicine    Chief Complaint: Follow-up gestational diabetes    HPI: 8-year-old female who is currently 34-week pregnant is here for follow-up of gestational diabetes.  She is currently on Levemir units at bedtime.  She unfortunately forgot to bring blood sugar records for review but tells me the morning sugars are less than 100 and during the day she runs less than 120 most of the time.  She is following a diet and she has done gestational class.  Baby is doing okay.  She is feeling better since she started following her diet.    Past Medical History:   Diagnosis Date   • Gestational diabetes    • Neutropenia (CMS/HCC)    • Vitamin B deficiency        Social History     Socioeconomic History   • Marital status:      Spouse name: Not on file   • Number of children: Not on file   • Years of education: Not on file   • Highest education level: Not on file   Tobacco Use   • Smoking status: Never Smoker   Substance and Sexual Activity   • Alcohol use: No     Frequency: Never       Family History   Problem Relation Age of Onset   • Diabetes Mother    • Diabetes Maternal Uncle        No Known Allergies    ROS:   Constitutional:  Denies fatigue, tiredness.    Eyes:  Denies change in visual acuity   HENT:  Denies nasal congestion or sore throat   Respiratory: denies cough, shortness of breath.   Cardiovascular:  denies chest pain, edema   GI:  Denies abdominal pain, nausea, vomiting.   Musculoskeletal:  Denies back pain or joint pain   Integument:  Denies dry skin and rash   Neurologic:  Denies headache, focal weakness or sensory changes   Endocrine:  Denies polyuria or polydipsia   Psychiatric:  Denies depression or anxiety      Vitals:    09/26/19 0857   BP: 118/65   Pulse: 89   SpO2: 97%       Physical Exam:  GEN: NAD, conversant, Obesity  EYES: EOMI, PERRL, no conjunctival  erythema  NECK: no thyromegaly, full ROM   CV: RRR, no murmurs/rubs/gallops, no peripheral edema  LUNG: CTAB, no wheezes/rales/ronchi  SKIN: no rashes, no acanthosis  MSK: no deformities, full ROM of all extremities  NEURO: no tremors, DTR normal  PSYCH: AOX3, appropriate mood, affect normal      Results Review:     I reviewed the patient's new clinical results.    Lab Results   Component Value Date    HGBA1C 5.8 (H) 09/05/2019      Lab Results   Component Value Date    GLUCOSE 79 01/29/2019    BUN 10 01/29/2019    CREATININE 0.8 01/29/2019    BCR 12.5 01/29/2019    K 3.5 (L) 01/29/2019    CO2 23 01/29/2019    CALCIUM 9.1 01/29/2019    ALBUMIN 4.3 01/29/2019    LABIL2 1.4 01/29/2019    AST 23 01/29/2019    ALT 14 01/29/2019          Medication Review: Reviewed.       Current Outpatient Medications:   •  acetone, urine, test strip, Check urine ketones every day in the morning, Disp: 50 strip, Rfl: 3  •  Blood Glucose Monitoring Suppl (FREESTYLE FREEDOM LITE) w/Device kit, Use as directed to check glucose, Disp: 1 each, Rfl: 0  •  Blood Glucose Monitoring Suppl (FREESTYLE LITE) device, USE AS DIRECTED TO CHECK GLUCOSE, Disp: , Rfl: 0  •  calcium carbonate (TUMS) 500 MG chewable tablet, Chew 1 tablet Daily., Disp: , Rfl:   •  ferrous sulfate 325 (65 FE) MG tablet, Take 1 tablet by mouth 2 (Two) Times a Day., Disp: 60 tablet, Rfl: 3  •  glucose blood (FREESTYLE TEST STRIPS) test strip, PRN, Disp: 60 each, Rfl: 5  •  Insulin Pen Needle (PEN NEEDLES) 32G X 4 MM misc, 1 each Every Night. Use with insulin pen nightly, Disp: 100 each, Rfl: 1  •  Lancets (FREESTYLE) lancets, USE TO TEST BLOOD SUGAR AS NEEDED, Disp: 300 each, Rfl: 1  •  LEVEMIR FLEXTOUCH 100 UNIT/ML injection, Pt to inject SQ 11 units every bedtime, Disp: 2 pen, Rfl: 2      Assessment/Plan   1.  Gestational diabetes mellitus: Well controlled with insulin and diet.  At this time will continue Levemir 11 units at bedtime and she is advised to send blood sugar  records for review on a weekly basis.  Last A1c was 5.8% on September 5, 2019.  I will check A1c next month.  2.  Obesity: She is already working on her diet her weight is trending down, advised and encouraged to continue to follow diet.            Telly Gutierrez MD FACE.    Much of the above report is an electronic transcription/translation of the spoken language to printed text using Dragon Software. As such, the subtleties and finesse of the spoken language may permit erroneous, or at times, nonsensical words or phrases to be inadvertently transcribed; thus changes may be made at a later date to rectify these errors.

## 2019-09-27 ENCOUNTER — TELEPHONE (OUTPATIENT)
Dept: ENDOCRINOLOGY | Facility: CLINIC | Age: 29
End: 2019-09-27

## 2019-09-27 NOTE — TELEPHONE ENCOUNTER
Responded to email from patient saying she lost her BG records. Advised to send new records next week  . Email scanned to media

## 2019-10-01 ENCOUNTER — HOSPITAL ENCOUNTER (OUTPATIENT)
Dept: ONCOLOGY | Facility: HOSPITAL | Age: 29
Discharge: HOME | End: 2019-10-01
Admitting: INTERNAL MEDICINE

## 2019-10-01 ENCOUNTER — TELEPHONE (OUTPATIENT)
Dept: ENDOCRINOLOGY | Facility: CLINIC | Age: 29
End: 2019-10-01

## 2019-10-01 VITALS
SYSTOLIC BLOOD PRESSURE: 130 MMHG | TEMPERATURE: 98.1 F | DIASTOLIC BLOOD PRESSURE: 64 MMHG | HEIGHT: 62 IN | RESPIRATION RATE: 18 BRPM | WEIGHT: 192 LBS | BODY MASS INDEX: 35.33 KG/M2 | HEART RATE: 64 BPM

## 2019-10-01 DIAGNOSIS — E53.8 B12 DEFICIENCY: Primary | ICD-10-CM

## 2019-10-01 PROCEDURE — 25010000002 CYANOCOBALAMIN PER 1000 MCG: Performed by: INTERNAL MEDICINE

## 2019-10-01 PROCEDURE — 96372 THER/PROPH/DIAG INJ SC/IM: CPT | Performed by: INTERNAL MEDICINE

## 2019-10-01 RX ORDER — CYANOCOBALAMIN 1000 UG/ML
1000 INJECTION, SOLUTION INTRAMUSCULAR; SUBCUTANEOUS ONCE
Status: COMPLETED | OUTPATIENT
Start: 2019-10-01 | End: 2019-10-01

## 2019-10-01 RX ADMIN — CYANOCOBALAMIN 1000 MCG: 1000 INJECTION, SOLUTION INTRAMUSCULAR; SUBCUTANEOUS at 14:43

## 2019-10-03 RX ORDER — INSULIN DETEMIR 100 [IU]/ML
INJECTION, SOLUTION SUBCUTANEOUS
Qty: 2 PEN | Refills: 2 | Status: SHIPPED | OUTPATIENT
Start: 2019-10-03 | End: 2019-10-14 | Stop reason: SDUPTHER

## 2019-10-09 ENCOUNTER — LAB (OUTPATIENT)
Dept: LAB | Facility: HOSPITAL | Age: 29
End: 2019-10-09

## 2019-10-09 ENCOUNTER — HOSPITAL ENCOUNTER (OUTPATIENT)
Dept: ONCOLOGY | Facility: HOSPITAL | Age: 29
Discharge: HOME | End: 2019-10-09
Admitting: INTERNAL MEDICINE

## 2019-10-09 VITALS
TEMPERATURE: 98.2 F | RESPIRATION RATE: 18 BRPM | DIASTOLIC BLOOD PRESSURE: 72 MMHG | SYSTOLIC BLOOD PRESSURE: 130 MMHG | WEIGHT: 194 LBS | HEART RATE: 74 BPM | BODY MASS INDEX: 35.7 KG/M2 | HEIGHT: 62 IN

## 2019-10-09 DIAGNOSIS — E66.09 CLASS 1 OBESITY DUE TO EXCESS CALORIES WITHOUT SERIOUS COMORBIDITY WITH BODY MASS INDEX (BMI) OF 34.0 TO 34.9 IN ADULT: ICD-10-CM

## 2019-10-09 DIAGNOSIS — E53.8 B12 DEFICIENCY: Primary | ICD-10-CM

## 2019-10-09 DIAGNOSIS — O24.414 INSULIN CONTROLLED GESTATIONAL DIABETES MELLITUS (GDM) IN THIRD TRIMESTER: ICD-10-CM

## 2019-10-09 DIAGNOSIS — E53.8 B12 DEFICIENCY: ICD-10-CM

## 2019-10-09 LAB
EXTERNAL GROUP B STREP ANTIGEN: NEGATIVE
HBA1C MFR BLD: 5.5 % (ref 3.5–5.6)

## 2019-10-09 PROCEDURE — 25010000002 CYANOCOBALAMIN PER 1000 MCG: Performed by: INTERNAL MEDICINE

## 2019-10-09 PROCEDURE — 96372 THER/PROPH/DIAG INJ SC/IM: CPT | Performed by: INTERNAL MEDICINE

## 2019-10-09 PROCEDURE — 36415 COLL VENOUS BLD VENIPUNCTURE: CPT

## 2019-10-09 PROCEDURE — 83036 HEMOGLOBIN GLYCOSYLATED A1C: CPT

## 2019-10-09 RX ORDER — CYANOCOBALAMIN 1000 UG/ML
1000 INJECTION, SOLUTION INTRAMUSCULAR; SUBCUTANEOUS ONCE
Status: CANCELLED | OUTPATIENT
Start: 2019-10-09

## 2019-10-09 RX ORDER — CYANOCOBALAMIN 1000 UG/ML
1000 INJECTION, SOLUTION INTRAMUSCULAR; SUBCUTANEOUS ONCE
Status: COMPLETED | OUTPATIENT
Start: 2019-10-09 | End: 2019-10-09

## 2019-10-09 RX ADMIN — CYANOCOBALAMIN 1000 MCG: 1000 INJECTION, SOLUTION INTRAMUSCULAR; SUBCUTANEOUS at 11:50

## 2019-10-14 RX ORDER — INSULIN DETEMIR 100 [IU]/ML
INJECTION, SOLUTION SUBCUTANEOUS
Qty: 2 PEN | Refills: 2 | Status: SHIPPED | OUTPATIENT
Start: 2019-10-14 | End: 2019-10-24

## 2019-10-15 DIAGNOSIS — E53.8 B12 DEFICIENCY: ICD-10-CM

## 2019-10-15 RX ORDER — CYANOCOBALAMIN 1000 UG/ML
1000 INJECTION, SOLUTION INTRAMUSCULAR; SUBCUTANEOUS ONCE
Status: CANCELLED | OUTPATIENT
Start: 2019-10-16

## 2019-10-16 ENCOUNTER — HOSPITAL ENCOUNTER (OUTPATIENT)
Dept: ONCOLOGY | Facility: HOSPITAL | Age: 29
Discharge: HOME | End: 2019-10-16
Admitting: NURSE PRACTITIONER

## 2019-10-16 ENCOUNTER — OFFICE VISIT (OUTPATIENT)
Dept: ONCOLOGY | Facility: CLINIC | Age: 29
End: 2019-10-16

## 2019-10-16 ENCOUNTER — APPOINTMENT (OUTPATIENT)
Dept: LAB | Facility: HOSPITAL | Age: 29
End: 2019-10-16

## 2019-10-16 VITALS
TEMPERATURE: 98.2 F | WEIGHT: 194 LBS | SYSTOLIC BLOOD PRESSURE: 104 MMHG | BODY MASS INDEX: 35.7 KG/M2 | DIASTOLIC BLOOD PRESSURE: 66 MMHG | HEART RATE: 67 BPM | RESPIRATION RATE: 18 BRPM | HEIGHT: 62 IN

## 2019-10-16 DIAGNOSIS — E53.8 B12 DEFICIENCY: ICD-10-CM

## 2019-10-16 DIAGNOSIS — D70.9 CHRONIC NEUTROPENIA (HCC): Primary | ICD-10-CM

## 2019-10-16 DIAGNOSIS — D64.9 ANEMIA, UNSPECIFIED TYPE: ICD-10-CM

## 2019-10-16 DIAGNOSIS — E53.8 B12 DEFICIENCY: Primary | ICD-10-CM

## 2019-10-16 LAB
BASOPHILS # BLD AUTO: 0.02 10*3/MM3 (ref 0–0.2)
BASOPHILS NFR BLD AUTO: 0.5 % (ref 0–1.5)
DEPRECATED RDW RBC AUTO: 48.8 FL (ref 37–54)
EOSINOPHIL # BLD AUTO: 0.05 10*3/MM3 (ref 0–0.4)
EOSINOPHIL NFR BLD AUTO: 1.2 % (ref 0.3–6.2)
ERYTHROCYTE [DISTWIDTH] IN BLOOD BY AUTOMATED COUNT: 15.4 % (ref 12.3–15.4)
HCT VFR BLD AUTO: 35.2 % (ref 34–46.6)
HGB BLD-MCNC: 11.4 G/DL (ref 12–15.9)
LYMPHOCYTES # BLD AUTO: 2.37 10*3/MM3 (ref 0.7–3.1)
LYMPHOCYTES NFR BLD AUTO: 58.1 % (ref 19.6–45.3)
MCH RBC QN AUTO: 28.6 PG (ref 26.6–33)
MCHC RBC AUTO-ENTMCNC: 32.4 G/DL (ref 31.5–35.7)
MCV RBC AUTO: 88.2 FL (ref 79–97)
MONOCYTES # BLD AUTO: 1.09 10*3/MM3 (ref 0.1–0.9)
MONOCYTES NFR BLD AUTO: 26.7 % (ref 5–12)
NEUTROPHILS # BLD AUTO: 0.55 10*3/MM3 (ref 1.7–7)
NEUTROPHILS NFR BLD AUTO: 13.5 % (ref 42.7–76)
PLATELET # BLD AUTO: 249 10*3/MM3 (ref 140–450)
PMV BLD AUTO: 8.6 FL (ref 6–12)
RBC # BLD AUTO: 3.99 10*6/MM3 (ref 3.77–5.28)
WBC NRBC COR # BLD: 4.08 10*3/MM3 (ref 3.4–10.8)

## 2019-10-16 PROCEDURE — 99214 OFFICE O/P EST MOD 30 MIN: CPT | Performed by: INTERNAL MEDICINE

## 2019-10-16 PROCEDURE — 85025 COMPLETE CBC W/AUTO DIFF WBC: CPT | Performed by: INTERNAL MEDICINE

## 2019-10-16 PROCEDURE — 25010000002 CYANOCOBALAMIN PER 1000 MCG: Performed by: INTERNAL MEDICINE

## 2019-10-16 PROCEDURE — 36415 COLL VENOUS BLD VENIPUNCTURE: CPT | Performed by: INTERNAL MEDICINE

## 2019-10-16 PROCEDURE — 96372 THER/PROPH/DIAG INJ SC/IM: CPT | Performed by: INTERNAL MEDICINE

## 2019-10-16 RX ORDER — LANOLIN ALCOHOL/MO/W.PET/CERES
1000 CREAM (GRAM) TOPICAL DAILY
COMMUNITY
End: 2019-11-01 | Stop reason: HOSPADM

## 2019-10-16 RX ORDER — FAMOTIDINE 20 MG/1
20 TABLET, FILM COATED ORAL 2 TIMES DAILY
COMMUNITY
End: 2019-10-24

## 2019-10-16 RX ORDER — CYANOCOBALAMIN 1000 UG/ML
1000 INJECTION, SOLUTION INTRAMUSCULAR; SUBCUTANEOUS ONCE
Status: COMPLETED | OUTPATIENT
Start: 2019-10-16 | End: 2019-10-16

## 2019-10-16 RX ADMIN — CYANOCOBALAMIN 1000 MCG: 1000 INJECTION, SOLUTION INTRAMUSCULAR; SUBCUTANEOUS at 16:53

## 2019-10-16 NOTE — PROGRESS NOTES
Hematology/Oncology Outpatient Follow Up    PATIENT NAME:Shikha Paris  :1990  MRN: [id number]  PRIMARY CARE PHYSICIAN: Candice Echols APRN  REFERRING PHYSICIAN: Candice Echols APRN    Chief Complaint   Patient presents with   • Follow-up     for chronic neutropenia      HISTORY OF PRESENT ILLNESS:   1. Chronic neutropenia diagnosis established originally in .  • She claimed to have developed a sore throat and fevers five days prior to her admission at [facility] on 3/15/14.  Her temperature went up to a maximum of 103.9 and she called Dr. Wetzel about this and was told to go to the emergency room if the fever went up again, which she did.  She had preliminary work-up done and was sent home.  Her temperature, however, on day of admission went up to 102 degrees.  She called Dr. Wetzel and was admitted to the hospital.  She claimed to have had a few blisters come up on her lips and the inside of her mouth a few days prior to admission.  She had developed a dry cough which caused her to vomit.  She was found to have a white blood cell count of 3.7 with 22% segs and infectious disease consult was obtained.  She was treated with antibiotics and antivirals and hematology consultation was called.  The patient stated that she was originally diagnosed with neutropenia in  when she was in the eighth grade.  She was living in [place] at that time and was worked up at [facility].  She had a bone marrow done on 04 which was normocellular containing trilateral maturation, rare iron stores, no abnormal infiltrates identified.  Flow cytometry revealed blast marker CD34 seen in 9% of gated cells and 5-6% of the total cells.  Bone marrow was repeated on 3/4/04 which revealed hypocellular marrow with erythroid predominance, 6.7% blasts, decreased neutrophils, eosinophilia and mild dyspoiesis changes of the myeloid and erythroid series.  Iron stores were absent.  The bone marrow was repeated  on 3/29/04 which revealed diluted, markedly hypocellular marrow aspirate.  Bone marrow biopsy showed mildly hypocellular marrow with adequate erythropoiesis and megakaryocytes, mild monocytes and relative eosinophilia but marked granulocytic hypoplasia.  Flow cytometry revealed a mixture of myeloid and lymphoid cells.  Fewer than 2% or cells expressed CD 34.  She was hospitalized at [facility] in July of 2009 and evaluated by Dr. Shaheen Ray.  A bone marrow biopsy was performed which revealed normal appearing megakaryocytes, evidence of normoblastic crythropoiesis and evidence of some myeloid maturation with left shift.  Flow cytometry was negative.  Cytogenetics was normal.  Peripheral blood flow cytometry was also performed which was normal.  • 3/11/4 - Chest x-ray negative.  • 3/12/14 - Flu screen negative.  • 3/15/14 - Chest x-ray revealed asymmetric abnormal small focal infiltrate in the posterior aspect of the right upper lobe of the lung suspicious for pneumonia.  • 3/16/14 - IgM 112 (N), IgA 178 (N), IgG 953 (N), EBV IgM negative, EBV IgG positive, CMV IgG normal, CMV IgM 2 (H).  • 3/17/14 - Peripheral blood flow cytometry normal.  • 4/1/14 - CXR: Complete clearing of the right upper lobe pneumonia.  The chest is return to normal.  Significant improvement from March 15, 2014.  • 4/8/14 - WBC 2.83, ANC 0.26.  • 4/8/14 - CMV IgG 0.1 (N), CMV IgM 0.42 (N).  • 7/15/14 - WBC 3.27, ANC 0.45. Comprehensive metabolic panel with serum creatinine 1.7 (0.6-1.2).   • 1/28/15 - Immunoglobulins normal. Serum creatinine 1.1 mg/dL.   • 9/24/15 - WBC 3.1 with 31% granulocytes, 51% lymphocytes, 18% monocytes, hemoglobin 12.7, platelet count 293,000. Patient has had redness to the right hip area with some pus and painful lymph node in the right groin for two days, taking Keflex through PMD. Neupogen 300 mcg subcu daily and Cipro 500 mg p.o. b.i.d. prescribed.   • 9/25/15 - Wound culture grew 2+ staphylococcus aureus MRSA susceptible  to Tetracycline, Bactrium, Vancomycin. Folate more than 24.8 (5.9-24.8). AGA negative. Vitamin B12 of 263 (180-914).   • 9/28/15 - Hip drainage culture came back positive for MRSA and Keflex and Cipro stopped. Patient started on Bactrim DS 1 p.o. q. 12 hours x14 days.   • 9/30/15 - Patient complained of sharp chest pains. EKG done revealing sinus rhythm with sinus arrhythmia. WBC increased to 9000 with Neupogen discontinued.   • 10/7/15 - WBC 2.8 with 22% granulocytes, 70% lymphocytes, 9% monocytes, hemoglobin 12.4, platelet count 213,000. Resolution of hyperemia and no more drainage from the right hip wound. Chest pains have decreased and are associated with exercise. IgA 165 (), IgG 1100 (600-1500), IgM 117 (). Chest x-ray with no acute cardiopulmonary abnormality.  (). Antineutrophil antibodies negative.   • 12/8/15 - WBC 3.1 with 34% granulocytes, 56% lymphocytes, 10% monocytes, hemoglobin 13, MCV 93.7, platelet count 258,000. Comprehensive metabolic panel with no significant abnormality. ARON screen negative.   • 12/10/15 - CT chest PE protocol with no acute cardiopulmonary abnormality.   • 2/8/16 - WBC 4 with 33% granulocytes, 55% lymphocytes, hemoglobin 13.3, platelet count 653,000. Patient claims to have had recurrent infections with upper respiratory and vaginal infections and is just finishing a course of Flagyl at present. Asked to start on Neupogen 450 mcg subq weekly x4 any time she gets an infection. HSV which showed herpes 1 antibody IgG >8.0 high (<0.9), herpes 2 antibody IgG <0.2 normal (<0.9), herpes simplex1/2 IgM <0.9 normal (<0.9).      • 6/28/16 - White count 4.02, hemoglobin 12.2, MCV 90.7 and platelet count 320,000. ANC is 570.   • 7/12/16 - Patient called the office reporting a skin infection, treated with Ciprofloxacin.   • 9/27/16 - Comprehensive metabolic panel with no significant abnormality. UA negative. Zika virus IgM negative and Zika RNA PCR IgM not detected.  Blood cultures with no growth. CMV IgM 0.27 (<0.91) and CMV IgG 0.1 (<0.9). IgA 167 (), IgG 1090 (600-1500), IgM 128 ().        • 10/4/16 - Blood typing was A positive. RPR non-reactive. Hepatitis B surface antigen non-reactive.   • 11/28/16 - Patient seen by Shiloh Oliver M.D. for second trimester of first pregnancy.   • 12/22/16 - Notified to increase potassium in her diet.   • 3/20/17 - Seen in ER at [facility] for nausea, vomiting and diarrhea. Treated with 1 liter lactated Ringer’s and Zofran. UA suspicious for UTI.  • 3/21/17 - White blood cell count 3.7, ANC 0.94, hemoglobin 12.2, MCV 94.9 and platelet count 249,000. Magnesium 1.9 (1.8-2.5), potassium 3.9 (3.6-5.1), sodium 133 (136-144).      • 6/20/17 - WBC 3.1 with 9% neutrophils, 61% lymphocytes, 24% monocytes, 5% eosinophils, hemoglobin 12.9, platelet count 275,000. Flow cytometry on peripheral blood with no evidence of abnormal myeloid maturation or an increased blast population. No evidence of lymphoproliferative disorder.   • 8/2/17 - Reviewed medication list for side effects causing neutropenia.  Pepcid AC can cause pancytopenia, and leukopenia. Valtrex can cause a decreased neutrophil count.   • 1/29/19 - Discussed possible second opinion at the [facility].   • 8/27/19 - Chest x-ray showed no active disease.  • 9/19/2019 patient 33 weeks pregnant.  Has not made her trip to the [facility] yet. WBC 3.84 with 20% neutrophils, 53% lymphocytes, 25% monocytes, hemoglobin 10.5, MCV 88.9, platelet count 299,000. Iron 31 (), iron sat 6 (15-50), TIBC 542 (228-428), ferritin 7 (), folate 16.50 (5.90-24.80), haptoglobin 125 (), retic 1.91 (0.70-1.90), Vitamin B12 139 (180-914), IgG 877 (600-1,500), IgM 105 (), IgA 163 ().     • 10/1/2019 patient started on vitamin B12 1000 mcg IM weekly x8 followed by monthly and ferrous sulfate 325 mg p.o. twice daily.    Past Medical History:   Diagnosis Date   •  Gestational diabetes    • Neutropenia (CMS/HCC)    • Vitamin B deficiency        Past Surgical History:   Procedure Laterality Date   • TONSILLECTOMY AND ADENOIDECTOMY  1998         Current Outpatient Medications:   •  acetone, urine, test strip, Check urine ketones every day in the morning, Disp: 50 strip, Rfl: 3  •  Blood Glucose Monitoring Suppl (FREESTYLE FREEDOM LITE) w/Device kit, Use as directed to check glucose, Disp: 1 each, Rfl: 0  •  Blood Glucose Monitoring Suppl (FREESTYLE LITE) device, USE AS DIRECTED TO CHECK GLUCOSE, Disp: , Rfl: 0  •  budesonide (RHINOCORT AQUA) 32 MCG/ACT nasal spray, 1 spray into the nostril(s) as directed by provider Daily., Disp: , Rfl:   •  calcium carbonate (TUMS) 500 MG chewable tablet, Chew 1 tablet Daily., Disp: , Rfl:   •  Cyanocobalamin 1000 MCG/ML kit, Inject  as directed., Disp: , Rfl:   •  famotidine (PEPCID) 20 MG tablet, Take 20 mg by mouth 2 (Two) Times a Day., Disp: , Rfl:   •  ferrous sulfate 325 (65 FE) MG tablet, Take 1 tablet by mouth 2 (Two) Times a Day., Disp: 60 tablet, Rfl: 3  •  glucose blood (FREESTYLE TEST STRIPS) test strip, PRN, Disp: 60 each, Rfl: 5  •  Insulin Pen Needle (PEN NEEDLES) 32G X 4 MM misc, 1 each Every Night. Use with insulin pen nightly, Disp: 100 each, Rfl: 1  •  Lancets (FREESTYLE) lancets, USE TO TEST BLOOD SUGAR AS NEEDED, Disp: 300 each, Rfl: 1  •  LEVEMIR FLEXTOUCH 100 UNIT/ML injection, Pt to inject SQ 15 units every bedtime, Disp: 2 pen, Rfl: 2  •  vitamin B-12 (CYANOCOBALAMIN) 1000 MCG tablet, Take 1,000 mcg by mouth Daily., Disp: , Rfl:   No current facility-administered medications for this visit.     Facility-Administered Medications Ordered in Other Visits:   •  cyanocobalamin injection 1,000 mcg, 1,000 mcg, Intramuscular, Once, Komal Tucker APRN    No Known Allergies    Family History   Problem Relation Age of Onset   • Diabetes Mother    • Diabetes Maternal Uncle        Cancer-related family history is not on  "file.    Social History     Tobacco Use   • Smoking status: Never Smoker   • Smokeless tobacco: Never Used   Substance Use Topics   • Alcohol use: No     Frequency: Never   • Drug use: No       I have reviewed the history of present illness, past medical history, family history, social history, lab results, all notes and other records since the patient was last seen on 9/19/19.      SUBJECTIVE: Patient is here for follow up of chronic neutropenia. Reports that she is 37 weeks pregnant and plan is to induce on 10/30/19. Denies any bleeding or infections.     LISET Willson present during office visit.       REVIEW OF SYSTEMS:  Review of Systems   Constitutional: Negative for chills and fever.   HENT: Negative for ear pain, mouth sores, nosebleeds and sore throat.    Eyes: Negative for photophobia and visual disturbance.   Respiratory: Negative for wheezing and stridor.    Cardiovascular: Negative for chest pain and palpitations.   Gastrointestinal: Negative for abdominal pain, diarrhea, nausea and vomiting.   Endocrine: Negative for cold intolerance and heat intolerance.   Genitourinary: Negative for dysuria and hematuria.   Musculoskeletal: Negative for joint swelling and neck stiffness.   Skin: Negative for color change and rash.   Neurological: Negative for seizures and syncope.   Hematological: Negative for adenopathy.        No obvious bleeding   Psychiatric/Behavioral: Negative for agitation, confusion and hallucinations.       OBJECTIVE:    Vitals:    10/16/19 1605   BP: 104/66   Pulse: 67   Resp: 18   Temp: 98.2 °F (36.8 °C)   Weight: 88 kg (194 lb)   Height: 157.5 cm (62\")   PainSc: 0-No pain       ECOG  (0) Fully active, able to carry on all predisease performance without restriction    Physical Exam   Constitutional: She is oriented to person, place, and time. No distress.   HENT:   Head: Normocephalic and atraumatic.   Dental fillings.    Eyes: Conjunctivae and EOM are normal. Right eye exhibits no " discharge. Left eye exhibits no discharge. No scleral icterus.   Neck: Normal range of motion. Neck supple. No thyromegaly present.   Cardiovascular: Normal rate, regular rhythm and normal heart sounds. Exam reveals no gallop and no friction rub.   Pulmonary/Chest: Effort normal. No stridor. No respiratory distress. She has no wheezes.   Abdominal: Soft. Bowel sounds are normal. She exhibits no mass. There is no tenderness. There is no rebound and no guarding.   Uterus is enlarged to the epigastric area.    Musculoskeletal: Normal range of motion. She exhibits no tenderness.   Trace edema bilateral lower extremities.    Lymphadenopathy:     She has no cervical adenopathy.   Neurological: She is alert and oriented to person, place, and time. She exhibits normal muscle tone.   Skin: Skin is warm. No rash noted. She is not diaphoretic. No erythema.   Psychiatric: She has a normal mood and affect. Her behavior is normal.   Nursing note and vitals reviewed.      RECENT LABS  WBC   Date Value Ref Range Status   10/16/2019 4.08 3.40 - 10.80 10*3/mm3 Final     RBC   Date Value Ref Range Status   10/16/2019 3.99 3.77 - 5.28 10*6/mm3 Final     Hemoglobin   Date Value Ref Range Status   10/16/2019 11.4 (L) 12.0 - 15.9 g/dL Final     Hematocrit   Date Value Ref Range Status   10/16/2019 35.2 34.0 - 46.6 % Final     MCV   Date Value Ref Range Status   10/16/2019 88.2 79.0 - 97.0 fL Final     MCH   Date Value Ref Range Status   10/16/2019 28.6 26.6 - 33.0 pg Final     MCHC   Date Value Ref Range Status   10/16/2019 32.4 31.5 - 35.7 g/dL Final     RDW   Date Value Ref Range Status   10/16/2019 15.4 12.3 - 15.4 % Final     RDW-SD   Date Value Ref Range Status   10/16/2019 48.8 37.0 - 54.0 fl Final     MPV   Date Value Ref Range Status   10/16/2019 8.6 6.0 - 12.0 fL Final     Platelets   Date Value Ref Range Status   10/16/2019 249 140 - 450 10*3/mm3 Final     Neutrophil %   Date Value Ref Range Status   10/16/2019 13.5 (L) 42.7 -  76.0 % Final     Lymphocyte %   Date Value Ref Range Status   10/16/2019 58.1 (H) 19.6 - 45.3 % Final     Monocyte %   Date Value Ref Range Status   10/16/2019 26.7 (H) 5.0 - 12.0 % Final     Eosinophil %   Date Value Ref Range Status   10/16/2019 1.2 0.3 - 6.2 % Final     Basophil %   Date Value Ref Range Status   10/16/2019 0.5 0.0 - 1.5 % Final     Neutrophils, Absolute   Date Value Ref Range Status   10/16/2019 0.55 (L) 1.70 - 7.00 10*3/mm3 Final     Lymphocytes, Absolute   Date Value Ref Range Status   10/16/2019 2.37 0.70 - 3.10 10*3/mm3 Final     Monocytes, Absolute   Date Value Ref Range Status   10/16/2019 1.09 (H) 0.10 - 0.90 10*3/mm3 Final     Eosinophils, Absolute   Date Value Ref Range Status   10/16/2019 0.05 0.00 - 0.40 10*3/mm3 Final     Basophils, Absolute   Date Value Ref Range Status   10/16/2019 0.02 0.00 - 0.20 10*3/mm3 Final     nRBC   Date Value Ref Range Status   05/20/2017 0 0 /100[WBCs] Final       Lab Results   Component Value Date    GLUCOSE 79 01/29/2019    BUN 10 01/29/2019    CREATININE 0.8 01/29/2019    BCR 12.5 01/29/2019    K 3.5 (L) 01/29/2019    CO2 23 01/29/2019    CALCIUM 9.1 01/29/2019    ALBUMIN 4.3 01/29/2019    LABIL2 1.4 01/29/2019    AST 23 01/29/2019    ALT 14 01/29/2019         Assessment/Plan     Chronic neutropenia (CMS/HCC)  - CBC & Differential  - CBC Auto Differential    Anemia, unspecified type    B12 deficiency  - Intrinsic Factor Ab  - Anti-parietal Antibody      ASSESSMENT:  Patient is currently taking ferrous sulfate 325 mg p.o. twice daily without any GI upset.  She is also taking vitamin B12 injections weekly.  Because of gestational diabetes she is being induced at 39 weeks.  Have discussed the possibility of changing to oral vitamin B12 replacement post delivery if pernicious anemia antibodies are negative.  Her hemoglobin is improved on current therapy.    PLAN:  Continue Ferrous Sulfate 325 mg by mouth twice daily.  Continue Vitamin B12 1,000 mcg IM  weekly.  Check intrinsic factor antibody and anti-parietal cell antibody today.  Ferritin level next visit.         I have reviewed lab results, imaging, vitals, and medications with the patient today. Will follow up in 1 month.     Patient verbalized understanding and is in agreement of the above plan.    I have reviewed and agree with the above information.   Neftaly Marino M.D., F.A.C.P.    Much of the above report is an electronic transcription/translation of the spoken language to printed text using Dragon Software. As such, the subtleties and finesse of the spoken language may permit erroneous, or at times, nonsensical words or phrases to be inadvertently transcribed; thus changes may be made at a later date to rectify these errors.

## 2019-10-18 LAB — IF BLOCK AB SER-ACNC: 1 AU/ML (ref 0–1.1)

## 2019-10-22 ENCOUNTER — TELEPHONE (OUTPATIENT)
Dept: ENDOCRINOLOGY | Facility: CLINIC | Age: 29
End: 2019-10-22

## 2019-10-22 DIAGNOSIS — E53.8 B12 DEFICIENCY: ICD-10-CM

## 2019-10-22 RX ORDER — CYANOCOBALAMIN 1000 UG/ML
1000 INJECTION, SOLUTION INTRAMUSCULAR; SUBCUTANEOUS ONCE
Status: CANCELLED | OUTPATIENT
Start: 2019-10-24

## 2019-10-22 RX ORDER — CYANOCOBALAMIN 1000 UG/ML
1000 INJECTION, SOLUTION INTRAMUSCULAR; SUBCUTANEOUS ONCE
OUTPATIENT
Start: 2019-12-25

## 2019-10-22 RX ORDER — CYANOCOBALAMIN 1000 UG/ML
1000 INJECTION, SOLUTION INTRAMUSCULAR; SUBCUTANEOUS ONCE
Status: CANCELLED | OUTPATIENT
Start: 2019-11-08

## 2019-10-22 RX ORDER — CYANOCOBALAMIN 1000 UG/ML
1000 INJECTION, SOLUTION INTRAMUSCULAR; SUBCUTANEOUS ONCE
OUTPATIENT
Start: 2019-11-27

## 2019-10-22 RX ORDER — CYANOCOBALAMIN 1000 UG/ML
1000 INJECTION, SOLUTION INTRAMUSCULAR; SUBCUTANEOUS ONCE
Status: CANCELLED | OUTPATIENT
Start: 2019-11-15

## 2019-10-22 RX ORDER — CYANOCOBALAMIN 1000 UG/ML
1000 INJECTION, SOLUTION INTRAMUSCULAR; SUBCUTANEOUS ONCE
Status: CANCELLED | OUTPATIENT
Start: 2019-11-20

## 2019-10-22 NOTE — TELEPHONE ENCOUNTER
Reached pt for RN CDE Gestational call. Reviewed goals and after delivery goals. Pt has MD appt 10/24  Due date next week   Stat sheet and goals scanned to Media

## 2019-10-23 ENCOUNTER — TELEPHONE (OUTPATIENT)
Dept: ENDOCRINOLOGY | Facility: CLINIC | Age: 29
End: 2019-10-23

## 2019-10-24 ENCOUNTER — OFFICE VISIT (OUTPATIENT)
Dept: ENDOCRINOLOGY | Facility: CLINIC | Age: 29
End: 2019-10-24

## 2019-10-24 ENCOUNTER — HOSPITAL ENCOUNTER (OUTPATIENT)
Dept: ONCOLOGY | Facility: HOSPITAL | Age: 29
Discharge: HOME | End: 2019-10-24
Admitting: NURSE PRACTITIONER

## 2019-10-24 VITALS
DIASTOLIC BLOOD PRESSURE: 60 MMHG | BODY MASS INDEX: 35.92 KG/M2 | HEART RATE: 78 BPM | WEIGHT: 195.2 LBS | RESPIRATION RATE: 18 BRPM | TEMPERATURE: 98.9 F | SYSTOLIC BLOOD PRESSURE: 102 MMHG | HEIGHT: 62 IN

## 2019-10-24 VITALS
DIASTOLIC BLOOD PRESSURE: 64 MMHG | HEART RATE: 80 BPM | BODY MASS INDEX: 35.7 KG/M2 | OXYGEN SATURATION: 97 % | SYSTOLIC BLOOD PRESSURE: 118 MMHG | WEIGHT: 194 LBS | HEIGHT: 62 IN

## 2019-10-24 DIAGNOSIS — O24.414 INSULIN CONTROLLED GESTATIONAL DIABETES MELLITUS (GDM) IN THIRD TRIMESTER: Primary | ICD-10-CM

## 2019-10-24 DIAGNOSIS — E53.8 B12 DEFICIENCY: Primary | ICD-10-CM

## 2019-10-24 LAB — GLUCOSE BLDC GLUCOMTR-MCNC: 93 MG/DL (ref 70–130)

## 2019-10-24 PROCEDURE — 99213 OFFICE O/P EST LOW 20 MIN: CPT | Performed by: INTERNAL MEDICINE

## 2019-10-24 PROCEDURE — 96372 THER/PROPH/DIAG INJ SC/IM: CPT | Performed by: INTERNAL MEDICINE

## 2019-10-24 PROCEDURE — 82962 GLUCOSE BLOOD TEST: CPT | Performed by: INTERNAL MEDICINE

## 2019-10-24 PROCEDURE — 25010000002 CYANOCOBALAMIN PER 1000 MCG: Performed by: INTERNAL MEDICINE

## 2019-10-24 RX ORDER — PANTOPRAZOLE SODIUM 40 MG/1
40 TABLET, DELAYED RELEASE ORAL 2 TIMES DAILY
COMMUNITY
End: 2019-11-01 | Stop reason: HOSPADM

## 2019-10-24 RX ORDER — CYANOCOBALAMIN 1000 UG/ML
1000 INJECTION, SOLUTION INTRAMUSCULAR; SUBCUTANEOUS ONCE
Status: COMPLETED | OUTPATIENT
Start: 2019-10-24 | End: 2019-10-24

## 2019-10-24 RX ORDER — INSULIN DETEMIR 100 [IU]/ML
INJECTION, SOLUTION SUBCUTANEOUS
Qty: 2 PEN | Refills: 2 | Status: SHIPPED | OUTPATIENT
Start: 2019-10-24 | End: 2019-11-01 | Stop reason: HOSPADM

## 2019-10-24 RX ADMIN — CYANOCOBALAMIN 1000 MCG: 1000 INJECTION, SOLUTION INTRAMUSCULAR; SUBCUTANEOUS at 08:29

## 2019-10-24 NOTE — PROGRESS NOTES
Endocrine Progress Note Outpatient     Patient Care Team:  Candice Echols APRN as PCP - General  Candice Echols APRN as PCP - Family Medicine    Chief Complaint: Follow-up gestational diabetes    HPI: 29-year-old female who is currently 38-week pregnant is here for follow-up of gestational diabetes.  She is currently on Levemir 15 units at bedtime.  She is following a diet and she has done gestational class.  Baby is doing okay.  She is feeling better since she started following her diet.  I have reviewed her blood sugar records from the last week, she does have trace ketones in the morning and she has occasional high blood sugars as well.  Past Medical History:   Diagnosis Date   • Gestational diabetes    • Neutropenia (CMS/HCC)    • Vitamin B deficiency        Social History     Socioeconomic History   • Marital status:      Spouse name: Not on file   • Number of children: Not on file   • Years of education: Not on file   • Highest education level: Not on file   Tobacco Use   • Smoking status: Never Smoker   • Smokeless tobacco: Never Used   Substance and Sexual Activity   • Alcohol use: No     Frequency: Never   • Drug use: No   • Sexual activity: Defer       Family History   Problem Relation Age of Onset   • Diabetes Mother    • Diabetes Maternal Uncle        No Known Allergies    ROS:   Constitutional:  Denies fatigue, tiredness.    Eyes:  Denies change in visual acuity   HENT:  Denies nasal congestion or sore throat   Respiratory: denies cough, shortness of breath.   Cardiovascular:  denies chest pain, edema   GI:  Denies abdominal pain, nausea, vomiting.   Musculoskeletal:  Denies back pain or joint pain   Integument:  Denies dry skin and rash   Neurologic:  Denies headache, focal weakness or sensory changes   Endocrine:  Denies polyuria or polydipsia   Psychiatric:  Denies depression or anxiety      Vitals:    10/24/19 1059   BP: 118/64   Pulse: 80   SpO2: 97%       Physical Exam:  GEN: NAD,  conversant, Obesity  EYES: EOMI, PERRL, no conjunctival erythema  NECK: no thyromegaly, full ROM   CV: RRR, no murmurs/rubs/gallops, no peripheral edema  LUNG: CTAB, no wheezes/rales/ronchi  SKIN: no rashes, no acanthosis  MSK: no deformities, full ROM of all extremities  NEURO: no tremors, DTR normal  PSYCH: AOX3, appropriate mood, affect normal      Results Review:     I reviewed the patient's new clinical results.    Lab Results   Component Value Date    HGBA1C 5.5 10/09/2019    HGBA1C 5.8 (H) 09/05/2019      Lab Results   Component Value Date    GLUCOSE 79 01/29/2019    BUN 10 01/29/2019    CREATININE 0.8 01/29/2019    BCR 12.5 01/29/2019    K 3.5 (L) 01/29/2019    CO2 23 01/29/2019    CALCIUM 9.1 01/29/2019    ALBUMIN 4.3 01/29/2019    LABIL2 1.4 01/29/2019    AST 23 01/29/2019    ALT 14 01/29/2019          Medication Review: Reviewed.       Current Outpatient Medications:   •  acetone, urine, test strip, Check urine ketones every day in the morning, Disp: 50 strip, Rfl: 3  •  Blood Glucose Monitoring Suppl (FREESTYLE FREEDOM LITE) w/Device kit, Use as directed to check glucose, Disp: 1 each, Rfl: 0  •  budesonide (RHINOCORT AQUA) 32 MCG/ACT nasal spray, 1 spray into the nostril(s) as directed by provider Daily., Disp: , Rfl:   •  calcium carbonate (TUMS) 500 MG chewable tablet, Chew 1 tablet Daily., Disp: , Rfl:   •  Cyanocobalamin 1000 MCG/ML kit, Inject  as directed. ONCE WEEKLY, Disp: , Rfl:   •  ferrous sulfate 325 (65 FE) MG tablet, Take 1 tablet by mouth 2 (Two) Times a Day., Disp: 60 tablet, Rfl: 3  •  glucose blood (FREESTYLE TEST STRIPS) test strip, PRN (Patient taking differently: USE TO CHECK BLOOD SUGAR FOUR TIMES DAILY), Disp: 60 each, Rfl: 5  •  Insulin Pen Needle (PEN NEEDLES) 32G X 4 MM misc, 1 each Every Night. Use with insulin pen nightly, Disp: 100 each, Rfl: 1  •  Lancets (FREESTYLE) lancets, USE TO TEST BLOOD SUGAR AS NEEDED, Disp: 300 each, Rfl: 1  •  LEVEMIR FLEXTOUCH 100 UNIT/ML  injection, Pt to inject SQ 15 units every bedtime, Disp: 2 pen, Rfl: 2  •  pantoprazole (PROTONIX) 40 MG EC tablet, Take 40 mg by mouth 2 (Two) Times a Day., Disp: , Rfl:   •  vitamin B-12 (CYANOCOBALAMIN) 1000 MCG tablet, Take 1,000 mcg by mouth Daily., Disp: , Rfl:   No current facility-administered medications for this visit.       Assessment/Plan   1.  Gestational diabetes mellitus: Uncontrolled with trace ketones every day in the morning also have some high blood sugars post meals occasionally.  Time I will add 5 units of Levemir in the morning and continue Levemir 15 units at night.  She is also eating a snack at bedtime.  We will continue to follow blood sugars    2.  Obesity: She is already working on her diet her weight is trending down, advised and encouraged to continue to follow diet.            Telly Gutierrez MD FACE.    Much of the above report is an electronic transcription/translation of the spoken language to printed text using Dragon Software. As such, the subtleties and finesse of the spoken language may permit erroneous, or at times, nonsensical words or phrases to be inadvertently transcribed; thus changes may be made at a later date to rectify these errors.

## 2019-10-24 NOTE — PATIENT INSTRUCTIONS
Start Levemir 5 units in the morning before breakfast and continue 15 units at bedtime  Follow-up in 3 months with labs.

## 2019-10-29 ENCOUNTER — PREP FOR SURGERY (OUTPATIENT)
Dept: OTHER | Facility: HOSPITAL | Age: 29
End: 2019-10-29

## 2019-10-29 RX ORDER — IBUPROFEN 600 MG/1
600 TABLET ORAL EVERY 6 HOURS PRN
Status: CANCELLED | OUTPATIENT
Start: 2019-10-29

## 2019-10-29 RX ORDER — SODIUM CHLORIDE 0.9 % (FLUSH) 0.9 %
3-10 SYRINGE (ML) INJECTION AS NEEDED
Status: CANCELLED | OUTPATIENT
Start: 2019-10-29

## 2019-10-29 RX ORDER — OXYTOCIN-SODIUM CHLORIDE 0.9% IV SOLN 30 UNIT/500ML 30-0.9/5 UT/ML-%
250 SOLUTION INTRAVENOUS CONTINUOUS
Status: CANCELLED | OUTPATIENT
Start: 2019-10-29 | End: 2019-10-29

## 2019-10-29 RX ORDER — METHYLERGONOVINE MALEATE 0.2 MG/ML
200 INJECTION INTRAVENOUS ONCE AS NEEDED
Status: CANCELLED | OUTPATIENT
Start: 2019-10-29

## 2019-10-29 RX ORDER — SODIUM CHLORIDE 0.9 % (FLUSH) 0.9 %
3 SYRINGE (ML) INJECTION EVERY 12 HOURS SCHEDULED
Status: CANCELLED | OUTPATIENT
Start: 2019-10-29

## 2019-10-29 RX ORDER — ONDANSETRON 2 MG/ML
4 INJECTION INTRAMUSCULAR; INTRAVENOUS EVERY 6 HOURS PRN
Status: CANCELLED | OUTPATIENT
Start: 2019-10-29

## 2019-10-29 RX ORDER — OXYTOCIN-SODIUM CHLORIDE 0.9% IV SOLN 30 UNIT/500ML 30-0.9/5 UT/ML-%
125 SOLUTION INTRAVENOUS CONTINUOUS PRN
Status: CANCELLED | OUTPATIENT
Start: 2019-10-29

## 2019-10-29 RX ORDER — ACETAMINOPHEN 325 MG/1
650 TABLET ORAL EVERY 4 HOURS PRN
Status: CANCELLED | OUTPATIENT
Start: 2019-10-29

## 2019-10-29 RX ORDER — CARBOPROST TROMETHAMINE 250 UG/ML
250 INJECTION, SOLUTION INTRAMUSCULAR AS NEEDED
Status: CANCELLED | OUTPATIENT
Start: 2019-10-29

## 2019-10-29 RX ORDER — LIDOCAINE HYDROCHLORIDE 10 MG/ML
5 INJECTION, SOLUTION EPIDURAL; INFILTRATION; INTRACAUDAL; PERINEURAL AS NEEDED
Status: CANCELLED | OUTPATIENT
Start: 2019-10-29

## 2019-10-29 RX ORDER — OXYTOCIN-SODIUM CHLORIDE 0.9% IV SOLN 30 UNIT/500ML 30-0.9/5 UT/ML-%
999 SOLUTION INTRAVENOUS ONCE
Status: CANCELLED | OUTPATIENT
Start: 2019-10-29 | End: 2019-10-29

## 2019-10-29 RX ORDER — MISOPROSTOL 200 UG/1
800 TABLET ORAL AS NEEDED
Status: CANCELLED | OUTPATIENT
Start: 2019-10-29

## 2019-10-29 RX ORDER — ONDANSETRON 4 MG/1
4 TABLET, FILM COATED ORAL EVERY 6 HOURS PRN
Status: CANCELLED | OUTPATIENT
Start: 2019-10-29

## 2019-10-29 RX ORDER — OXYTOCIN-SODIUM CHLORIDE 0.9% IV SOLN 30 UNIT/500ML 30-0.9/5 UT/ML-%
2 SOLUTION INTRAVENOUS
Status: CANCELLED | OUTPATIENT
Start: 2019-10-30

## 2019-10-29 RX ORDER — SODIUM CHLORIDE, SODIUM LACTATE, POTASSIUM CHLORIDE, CALCIUM CHLORIDE 600; 310; 30; 20 MG/100ML; MG/100ML; MG/100ML; MG/100ML
125 INJECTION, SOLUTION INTRAVENOUS CONTINUOUS
Status: CANCELLED | OUTPATIENT
Start: 2019-10-29

## 2019-10-29 RX ORDER — MORPHINE SULFATE 4 MG/ML
4 INJECTION, SOLUTION INTRAMUSCULAR; INTRAVENOUS
Status: CANCELLED | OUTPATIENT
Start: 2019-10-29

## 2019-10-29 NOTE — H&P
Obstetric History and Physical     Chief Complaint: Induction of labor    Subjective     Patient is a 29 y.o. female  with EDC of 19 , who presents for Induction of labor.    Her prenatal care is complicated by  diabetes  GDM A2 and Diabetes has been  well controlled..  Her previous obstetric/gynecological history is noted for Chronic neutropenia she is followed by Heme/Onc.      Prenatal Information: Please see office H&P for full history  Prenatal Results     Initial Prenatal Labs     Test Value Reference Range Date Time    Hemoglobin        Hematocrit        Platelets 249 10*3/mm3 140 - 450 10*3/mm3 10/16/19 1611    Rubella IgG        Hepatitis B SAg        Hepatitis C Ab        RPR        ABO        Rh        Antibody Screen        HIV        Urine Culture        Gonorrhea        Chlamydia        TSH              2nd and 3rd Trimester     Test Value Reference Range Date Time    Hemoglobin (repeated) 11.4 g/dL 12.0 - 15.9 g/dL 10/16/19 1611    Hematocrit (repeated) 35.2 % 34.0 - 46.6 % 10/16/19 1611    GCT        Antibody Screen (repeated)        GTT Fasting        GTT 1 Hr        GTT 2 Hr        GTT 3 Hr        Group B Strep              Drug Screening     Test Value Reference Range Date Time    Amphetamine Screen        Barbiturate Screen        Benzodiazepine Screen        Methadone Screen        Phencyclidine Screen        Opiates Screen        THC Screen        Cocaine Screen        Propoxyphene Screen        Buprenorphine Screen        Methamphetamine Screen        Oxycodone Screen        Tricyclic Antidepressants Screen              Other (Risk screening)     Test Value Reference Range Date Time    Varicella IgG        Parvovirus IgG        CMV IgG        Cystic Fibrosis        Hemoglobin electrophoresis        NIPT        MSAFP-4        AFP (for NTD only)                  External Prenatal Results     Pregnancy Outside Results - Transcribed From Office Records - See Scanned Records For  Details     Test Value Date Time    Hgb 11.4 g/dL 10/16/19 1611    Hct 35.2 % 10/16/19 1611    ABO       Rh       Antibody Screen  NEGATIVE  17 2355    Glucose Fasting GTT       Glucose Tolerance Test 1 hour       Glucose Tolerance Test 3 hour       Gonorrhea (discrete)       Chlamydia (discrete)       RPR       VDRL       Syphilis Antibody       Rubella       HBsAg       Herpes Simplex Virus PCR       Herpes Simplex VIrus Culture       HIV       Hep C RNA Quant PCR       Hep C Antibody       AFP       Group B Strep       GBS Susceptibility to Clindamycin       GBS Susceptibility to Erythromycin       Fetal Fibronectin       Genetic Testing, Maternal Blood             Drug Screening     Test Value Date Time    Urine Drug Screen       Amphetamine Screen NEGATIVE  17    Barbiturate Screen NEGATIVE  17    Benzodiazepine Screen NEGATIVE  17    Methadone Screen NEGATIVE  17    Phencyclidine Screen NEGATIVE  17    Opiates Screen       THC Screen NEGATIVE  17    Cocaine Screen       Propoxyphene Screen       Buprenorphine Screen       Methamphetamine Screen       Oxycodone Screen       Tricyclic Antidepressants Screen                    Past OB History:       Obstetric History       T0      L0     SAB0   TAB0   Ectopic0   Molar0   Multiple0   Live Births0                Past Medical History: Past Medical History:   Diagnosis Date   • Gestational diabetes    • Neutropenia (CMS/HCC)    • Vitamin B deficiency         Past Surgical History Past Surgical History:   Procedure Laterality Date   • TONSILLECTOMY AND ADENOIDECTOMY          Family History: Family History   Problem Relation Age of Onset   • Diabetes Mother    • Diabetes Maternal Uncle       Social History:  reports that she has never smoked. She has never used smokeless tobacco.   reports that she does not drink alcohol.   reports that she does not use drugs.        General ROS:  Pertinent items are noted in HPI    Objective      Vitals:   There were no vitals filed for this visit.    Fetal Heart Rate Assessment:   +FHTs per doppler in office       Physical Exam:     General Appearance:    Alert, cooperative, in no acute distress   Abdomen:     Soft, non-tender, no guarding, no rebound tenderness,       EFW 7 1/2 #.   Pelvic Exam:    Pelvis adequate.    Presentation: Vertex    Cervix: 2/50 in office   Extremities:   Moves all extremities well, no edema, no cyanosis, no              redness   Skin:   No bleeding, bruising or rash   Neurologic:   No focal neurologic defect          Laboratory Results:   Lab Results (last 48 hours)     ** No results found for the last 48 hours. **             Assessment/Plan     Active Problems:    * No active hospital problems. *         Assessment:  1.  Intrauterine pregnancy at 39w6d gestation with reassuring fetal status.    2.  induction of labor  for GDM- well controlled on insulin  with favorable cervix   3.  GBS status: Neg  Plan:  1. fetal and uterine monitoring  continuously and labor augmentation  Pitocin  2. Plan of care has been reviewed with patient.  3.  Risks, benefits of treatment plan have been discussed.  4.  All questions have been answered.         Shiloh Oliver MD   10/29/2019   9:06 AM

## 2019-10-30 ENCOUNTER — HOSPITAL ENCOUNTER (INPATIENT)
Facility: HOSPITAL | Age: 29
LOS: 2 days | Discharge: HOME | End: 2019-11-01
Attending: OBSTETRICS & GYNECOLOGY | Admitting: OBSTETRICS & GYNECOLOGY

## 2019-10-30 ENCOUNTER — HOSPITAL ENCOUNTER (OUTPATIENT)
Dept: LABOR AND DELIVERY | Facility: HOSPITAL | Age: 29
Discharge: HOME | End: 2019-10-30

## 2019-10-30 ENCOUNTER — ANESTHESIA (OUTPATIENT)
Dept: LABOR AND DELIVERY | Facility: HOSPITAL | Age: 29
End: 2019-10-30

## 2019-10-30 ENCOUNTER — ANESTHESIA EVENT (OUTPATIENT)
Dept: LABOR AND DELIVERY | Facility: HOSPITAL | Age: 29
End: 2019-10-30

## 2019-10-30 PROBLEM — O24.419 GESTATIONAL DIABETES: Status: ACTIVE | Noted: 2019-10-30

## 2019-10-30 LAB
ABO GROUP BLD: NORMAL
BASOPHILS # BLD AUTO: 0 10*3/MM3 (ref 0–0.2)
BASOPHILS NFR BLD AUTO: 0.2 % (ref 0–1.5)
BLD GP AB SCN SERPL QL: NEGATIVE
DEPRECATED RDW RBC AUTO: 52.9 FL (ref 37–54)
EOSINOPHIL # BLD AUTO: 0.1 10*3/MM3 (ref 0–0.4)
EOSINOPHIL NFR BLD AUTO: 2.2 % (ref 0.3–6.2)
ERYTHROCYTE [DISTWIDTH] IN BLOOD BY AUTOMATED COUNT: 17.2 % (ref 12.3–15.4)
GLUCOSE BLDC GLUCOMTR-MCNC: 118 MG/DL (ref 70–105)
GLUCOSE BLDC GLUCOMTR-MCNC: 119 MG/DL (ref 70–105)
GLUCOSE BLDC GLUCOMTR-MCNC: 130 MG/DL (ref 70–105)
GLUCOSE BLDC GLUCOMTR-MCNC: 62 MG/DL (ref 70–105)
GLUCOSE BLDC GLUCOMTR-MCNC: 71 MG/DL (ref 70–105)
GLUCOSE BLDC GLUCOMTR-MCNC: 80 MG/DL (ref 70–105)
HCT VFR BLD AUTO: 34.1 % (ref 34–46.6)
HGB BLD-MCNC: 11.6 G/DL (ref 12–15.9)
HIV1+2 AB SER QL: NORMAL
LYMPHOCYTES # BLD AUTO: 2.3 10*3/MM3 (ref 0.7–3.1)
LYMPHOCYTES NFR BLD AUTO: 58.1 % (ref 19.6–45.3)
MCH RBC QN AUTO: 29.7 PG (ref 26.6–33)
MCHC RBC AUTO-ENTMCNC: 33.8 G/DL (ref 31.5–35.7)
MCV RBC AUTO: 87.9 FL (ref 79–97)
MONOCYTES # BLD AUTO: 1 10*3/MM3 (ref 0.1–0.9)
MONOCYTES NFR BLD AUTO: 24.6 % (ref 5–12)
NEUTROPHILS # BLD AUTO: 0.6 10*3/MM3 (ref 1.7–7)
NEUTROPHILS NFR BLD AUTO: 14.9 % (ref 42.7–76)
NRBC BLD AUTO-RTO: 0.2 /100 WBC (ref 0–0.2)
PLATELET # BLD AUTO: 282 10*3/MM3 (ref 140–450)
PMV BLD AUTO: 7 FL (ref 6–12)
RBC # BLD AUTO: 3.89 10*6/MM3 (ref 3.77–5.28)
RH BLD: POSITIVE
T&S EXPIRATION DATE: NORMAL
WBC NRBC COR # BLD: 3.9 10*3/MM3 (ref 3.4–10.8)

## 2019-10-30 PROCEDURE — 10907ZC DRAINAGE OF AMNIOTIC FLUID, THERAPEUTIC FROM PRODUCTS OF CONCEPTION, VIA NATURAL OR ARTIFICIAL OPENING: ICD-10-PCS | Performed by: OBSTETRICS & GYNECOLOGY

## 2019-10-30 PROCEDURE — 86592 SYPHILIS TEST NON-TREP QUAL: CPT | Performed by: OBSTETRICS & GYNECOLOGY

## 2019-10-30 PROCEDURE — 25010000002 ROPIVACAINE PER 1 MG: Performed by: ANESTHESIOLOGY

## 2019-10-30 PROCEDURE — C1755 CATHETER, INTRASPINAL: HCPCS | Performed by: ANESTHESIOLOGY

## 2019-10-30 PROCEDURE — 86901 BLOOD TYPING SEROLOGIC RH(D): CPT

## 2019-10-30 PROCEDURE — 25010000002 FENTANYL CITRATE (PF) 100 MCG/2ML SOLUTION 2 ML VIAL: Performed by: ANESTHESIOLOGY

## 2019-10-30 PROCEDURE — 3E033VJ INTRODUCTION OF OTHER HORMONE INTO PERIPHERAL VEIN, PERCUTANEOUS APPROACH: ICD-10-PCS | Performed by: OBSTETRICS & GYNECOLOGY

## 2019-10-30 PROCEDURE — 82962 GLUCOSE BLOOD TEST: CPT

## 2019-10-30 PROCEDURE — G0432 EIA HIV-1/HIV-2 SCREEN: HCPCS | Performed by: OBSTETRICS & GYNECOLOGY

## 2019-10-30 PROCEDURE — 85025 COMPLETE CBC W/AUTO DIFF WBC: CPT | Performed by: OBSTETRICS & GYNECOLOGY

## 2019-10-30 PROCEDURE — 86901 BLOOD TYPING SEROLOGIC RH(D): CPT | Performed by: OBSTETRICS & GYNECOLOGY

## 2019-10-30 PROCEDURE — 86900 BLOOD TYPING SEROLOGIC ABO: CPT | Performed by: OBSTETRICS & GYNECOLOGY

## 2019-10-30 PROCEDURE — 63710000001 INSULIN REGULAR HUMAN PER 5 UNITS: Performed by: OBSTETRICS & GYNECOLOGY

## 2019-10-30 PROCEDURE — 86900 BLOOD TYPING SEROLOGIC ABO: CPT

## 2019-10-30 PROCEDURE — 86850 RBC ANTIBODY SCREEN: CPT | Performed by: OBSTETRICS & GYNECOLOGY

## 2019-10-30 RX ORDER — OXYTOCIN-SODIUM CHLORIDE 0.9% IV SOLN 30 UNIT/500ML 30-0.9/5 UT/ML-%
999 SOLUTION INTRAVENOUS ONCE
Status: DISCONTINUED | OUTPATIENT
Start: 2019-10-30 | End: 2019-11-01 | Stop reason: HOSPADM

## 2019-10-30 RX ORDER — ONDANSETRON 2 MG/ML
4 INJECTION INTRAMUSCULAR; INTRAVENOUS EVERY 6 HOURS PRN
Status: DISCONTINUED | OUTPATIENT
Start: 2019-10-30 | End: 2019-10-30 | Stop reason: HOSPADM

## 2019-10-30 RX ORDER — ACETAMINOPHEN 325 MG/1
650 TABLET ORAL EVERY 4 HOURS PRN
Status: DISCONTINUED | OUTPATIENT
Start: 2019-10-30 | End: 2019-10-30

## 2019-10-30 RX ORDER — SODIUM CHLORIDE 0.9 % (FLUSH) 0.9 %
3 SYRINGE (ML) INJECTION EVERY 12 HOURS SCHEDULED
Status: DISCONTINUED | OUTPATIENT
Start: 2019-10-30 | End: 2019-10-30 | Stop reason: HOSPADM

## 2019-10-30 RX ORDER — OXYTOCIN-SODIUM CHLORIDE 0.9% IV SOLN 30 UNIT/500ML 30-0.9/5 UT/ML-%
125 SOLUTION INTRAVENOUS CONTINUOUS PRN
Status: COMPLETED | OUTPATIENT
Start: 2019-10-30 | End: 2019-10-30

## 2019-10-30 RX ORDER — SODIUM CHLORIDE, SODIUM LACTATE, POTASSIUM CHLORIDE, CALCIUM CHLORIDE 600; 310; 30; 20 MG/100ML; MG/100ML; MG/100ML; MG/100ML
125 INJECTION, SOLUTION INTRAVENOUS CONTINUOUS
Status: DISCONTINUED | OUTPATIENT
Start: 2019-10-30 | End: 2019-10-30

## 2019-10-30 RX ORDER — OXYTOCIN-SODIUM CHLORIDE 0.9% IV SOLN 30 UNIT/500ML 30-0.9/5 UT/ML-%
250 SOLUTION INTRAVENOUS CONTINUOUS
Status: ACTIVE | OUTPATIENT
Start: 2019-10-30 | End: 2019-10-30

## 2019-10-30 RX ORDER — MISOPROSTOL 200 UG/1
800 TABLET ORAL AS NEEDED
Status: DISCONTINUED | OUTPATIENT
Start: 2019-10-30 | End: 2019-10-30 | Stop reason: HOSPADM

## 2019-10-30 RX ORDER — CARBOPROST TROMETHAMINE 250 UG/ML
250 INJECTION, SOLUTION INTRAMUSCULAR AS NEEDED
Status: DISCONTINUED | OUTPATIENT
Start: 2019-10-30 | End: 2019-10-30 | Stop reason: HOSPADM

## 2019-10-30 RX ORDER — EPHEDRINE SULFATE 50 MG/ML
5 INJECTION, SOLUTION INTRAVENOUS
Status: DISCONTINUED | OUTPATIENT
Start: 2019-10-30 | End: 2019-10-30 | Stop reason: HOSPADM

## 2019-10-30 RX ORDER — LIDOCAINE HYDROCHLORIDE 10 MG/ML
5 INJECTION, SOLUTION EPIDURAL; INFILTRATION; INTRACAUDAL; PERINEURAL AS NEEDED
Status: DISCONTINUED | OUTPATIENT
Start: 2019-10-30 | End: 2019-10-30 | Stop reason: HOSPADM

## 2019-10-30 RX ORDER — METHYLERGONOVINE MALEATE 0.2 MG/ML
200 INJECTION INTRAVENOUS ONCE AS NEEDED
Status: DISCONTINUED | OUTPATIENT
Start: 2019-10-30 | End: 2019-10-30 | Stop reason: HOSPADM

## 2019-10-30 RX ORDER — OXYTOCIN-SODIUM CHLORIDE 0.9% IV SOLN 30 UNIT/500ML 30-0.9/5 UT/ML-%
2 SOLUTION INTRAVENOUS
Status: DISCONTINUED | OUTPATIENT
Start: 2019-10-30 | End: 2019-10-30 | Stop reason: HOSPADM

## 2019-10-30 RX ORDER — SODIUM CHLORIDE 0.9 % (FLUSH) 0.9 %
3-10 SYRINGE (ML) INJECTION AS NEEDED
Status: DISCONTINUED | OUTPATIENT
Start: 2019-10-30 | End: 2019-10-30 | Stop reason: HOSPADM

## 2019-10-30 RX ORDER — BUPIVACAINE HYDROCHLORIDE 2.5 MG/ML
INJECTION, SOLUTION EPIDURAL; INFILTRATION; INTRACAUDAL AS NEEDED
Status: DISCONTINUED | OUTPATIENT
Start: 2019-10-30 | End: 2019-10-30 | Stop reason: SURG

## 2019-10-30 RX ORDER — ONDANSETRON 4 MG/1
4 TABLET, FILM COATED ORAL EVERY 6 HOURS PRN
Status: DISCONTINUED | OUTPATIENT
Start: 2019-10-30 | End: 2019-10-30 | Stop reason: HOSPADM

## 2019-10-30 RX ORDER — BUPIVACAINE HYDROCHLORIDE 2.5 MG/ML
INJECTION, SOLUTION EPIDURAL; INFILTRATION; INTRACAUDAL
Status: COMPLETED
Start: 2019-10-30 | End: 2019-10-30

## 2019-10-30 RX ORDER — LIDOCAINE HYDROCHLORIDE AND EPINEPHRINE 10; 10 MG/ML; UG/ML
INJECTION, SOLUTION INFILTRATION; PERINEURAL AS NEEDED
Status: DISCONTINUED | OUTPATIENT
Start: 2019-10-30 | End: 2019-10-30 | Stop reason: SURG

## 2019-10-30 RX ORDER — MORPHINE SULFATE 4 MG/ML
4 INJECTION, SOLUTION INTRAMUSCULAR; INTRAVENOUS
Status: DISCONTINUED | OUTPATIENT
Start: 2019-10-30 | End: 2019-10-30 | Stop reason: HOSPADM

## 2019-10-30 RX ORDER — IBUPROFEN 600 MG/1
600 TABLET ORAL EVERY 6 HOURS PRN
Status: DISCONTINUED | OUTPATIENT
Start: 2019-10-30 | End: 2019-10-30 | Stop reason: HOSPADM

## 2019-10-30 RX ORDER — IBUPROFEN 600 MG/1
600 TABLET ORAL EVERY 6 HOURS PRN
Status: DISCONTINUED | OUTPATIENT
Start: 2019-10-30 | End: 2019-11-01 | Stop reason: HOSPADM

## 2019-10-30 RX ORDER — ACETAMINOPHEN 325 MG/1
650 TABLET ORAL EVERY 4 HOURS PRN
Status: DISCONTINUED | OUTPATIENT
Start: 2019-10-30 | End: 2019-10-30 | Stop reason: HOSPADM

## 2019-10-30 RX ADMIN — ROPIVACAINE HYDROCHLORIDE 8 ML/HR: 2 INJECTION, SOLUTION EPIDURAL; INFILTRATION; PERINEURAL at 10:09

## 2019-10-30 RX ADMIN — WITCH HAZEL: 500 SOLUTION RECTAL; TOPICAL at 19:16

## 2019-10-30 RX ADMIN — SODIUM CHLORIDE, SODIUM LACTATE, POTASSIUM CHLORIDE, AND CALCIUM CHLORIDE 125 ML/HR: 600; 310; 30; 20 INJECTION, SOLUTION INTRAVENOUS at 11:04

## 2019-10-30 RX ADMIN — BENZOCAINE 2 SPRAY: 11.4 AEROSOL, SPRAY TOPICAL at 19:16

## 2019-10-30 RX ADMIN — INSULIN HUMAN 6 UNITS: 100 INJECTION, SOLUTION PARENTERAL at 11:58

## 2019-10-30 RX ADMIN — SODIUM CHLORIDE, SODIUM LACTATE, POTASSIUM CHLORIDE, AND CALCIUM CHLORIDE 125 ML/HR: 600; 310; 30; 20 INJECTION, SOLUTION INTRAVENOUS at 05:31

## 2019-10-30 RX ADMIN — OXYTOCIN 2 MILLI-UNITS/MIN: 10 INJECTION INTRAVENOUS at 05:54

## 2019-10-30 RX ADMIN — LIDOCAINE HYDROCHLORIDE,EPINEPHRINE BITARTRATE 3 ML: 10; .01 INJECTION, SOLUTION INFILTRATION; PERINEURAL at 10:08

## 2019-10-30 RX ADMIN — BUPIVACAINE HYDROCHLORIDE 5 MG: 2.5 INJECTION, SOLUTION EPIDURAL; INFILTRATION; INTRACAUDAL; PERINEURAL at 10:09

## 2019-10-30 RX ADMIN — IBUPROFEN 600 MG: 600 TABLET ORAL at 20:40

## 2019-10-30 RX ADMIN — OXYTOCIN 125 ML/HR: 10 INJECTION INTRAVENOUS at 18:41

## 2019-10-30 NOTE — ANESTHESIA PROCEDURE NOTES
Labor Epidural      Patient reassessed immediately prior to procedure    Patient location during procedure: OB  Start Time: 10/30/2019 9:56 AM  Stop Time: 10/30/2019 10:08 AM  Indication:at surgeon's request  Performed By  Anesthesiologist: Jarocho Clay MD  Preanesthetic Checklist  Completed: patient identified, site marked, surgical consent, pre-op evaluation, timeout performed, IV checked, risks and benefits discussed and monitors and equipment checked  Prep:  Pt Position:sitting  Sterile Tech:cap, gloves, mask and sterile barrier  Prep:chlorhexidine gluconate and isopropyl alcohol  Monitoring:blood pressure monitoring and continuous pulse oximetry  Epidural Block Procedure:  Approach:midline  Location:L2-L3  Needle Type:Tuohy  Needle Gauge:17 G  Loss of Resistance Medium: saline  Loss of Resistance: 5cm  Cath Depth at skin:10 cm  Paresthesia: none  Aspiration:negative  Test Dose:negative  Number of Attempts: 1  Post Assessment:  Dressing:occlusive dressing applied and secured with tape  Pt Tolerance:patient tolerated the procedure well with no apparent complications  Complications:no

## 2019-10-30 NOTE — ANESTHESIA PREPROCEDURE EVALUATION
Anesthesia Evaluation     Patient summary reviewed and Nursing notes reviewed   NPO Solid Status: > 2 hours  NPO Liquid Status: > 2 hours           Airway   Mallampati: II  TM distance: >3 FB  Neck ROM: full  No difficulty expected  Dental - normal exam     Pulmonary - negative pulmonary ROS and normal exam   Cardiovascular - negative cardio ROS and normal exam        Neuro/Psych- negative ROS  GI/Hepatic/Renal/Endo    (+) obesity,   diabetes mellitus gestational,     Musculoskeletal (-) negative ROS    Abdominal    Substance History - negative use     OB/GYN negative ob/gyn ROS         Other                        Anesthesia Plan    ASA 2     epidural     Anesthetic plan, all risks, benefits, and alternatives have been provided, discussed and informed consent has been obtained with: patient.

## 2019-10-31 RX ADMIN — IBUPROFEN 600 MG: 600 TABLET ORAL at 10:36

## 2019-10-31 RX ADMIN — IBUPROFEN 600 MG: 600 TABLET ORAL at 16:36

## 2019-10-31 RX ADMIN — IBUPROFEN 600 MG: 600 TABLET ORAL at 04:03

## 2019-10-31 NOTE — ANESTHESIA POSTPROCEDURE EVALUATION
Patient: Shikha Paris    Procedure Summary     Date:  10/30/19 Room / Location:      Anesthesia Start:  0956 Anesthesia Stop:  1734    Procedure:  LABOR ANALGESIA Diagnosis:      Scheduled Providers:   Provider:  Jarocho Clay MD    Anesthesia Type:  epidural ASA Status:  2          Anesthesia Type: epidural  Last vitals  BP   107/70 (10/30/19 1955)   Temp   98.6 °F (37 °C) (10/30/19 1955)   Pulse   67 (10/30/19 1955)   Resp   18 (10/30/19 1955)     SpO2   99 % (10/30/19 1955)     Post Anesthesia Care and Evaluation    Patient location during evaluation: PACU  Patient participation: complete - patient participated  Level of consciousness: awake  Pain scale: See nurse's notes for pain score.  Pain management: adequate  Airway patency: patent  Anesthetic complications: No anesthetic complications  PONV Status: none  Cardiovascular status: acceptable  Respiratory status: acceptable  Hydration status: acceptable  Post Neuraxial Block status: Motor and sensory function returned to baseline and No signs or symptoms of PDPH  Comments: Patient seen and examined postoperatively; vital signs stable; SpO2 greater than or equal to 90%; cardiopulmonary status stable; nausea/vomiting adequately controlled; pain adequately controlled; no apparent anesthesia complications; patient discharged from anesthesia care when discharge criteria were met

## 2019-11-01 VITALS
SYSTOLIC BLOOD PRESSURE: 102 MMHG | BODY MASS INDEX: 36.39 KG/M2 | DIASTOLIC BLOOD PRESSURE: 64 MMHG | HEIGHT: 62 IN | HEART RATE: 70 BPM | WEIGHT: 197.75 LBS | RESPIRATION RATE: 17 BRPM | OXYGEN SATURATION: 97 % | TEMPERATURE: 98.2 F

## 2019-11-01 PROBLEM — O24.419 GESTATIONAL DIABETES: Status: RESOLVED | Noted: 2019-10-30 | Resolved: 2019-11-01

## 2019-11-01 LAB
ANISOCYTOSIS BLD QL: ABNORMAL
BASOPHILS # BLD MANUAL: 0.06 10*3/MM3 (ref 0–0.2)
BASOPHILS NFR BLD AUTO: 1 % (ref 0–1.5)
DEPRECATED RDW RBC AUTO: 52.5 FL (ref 37–54)
EOSINOPHIL # BLD MANUAL: 0.12 10*3/MM3 (ref 0–0.4)
EOSINOPHIL NFR BLD MANUAL: 2 % (ref 0.3–6.2)
ERYTHROCYTE [DISTWIDTH] IN BLOOD BY AUTOMATED COUNT: 17.1 % (ref 12.3–15.4)
HCT VFR BLD AUTO: 36.7 % (ref 34–46.6)
HGB BLD-MCNC: 12.2 G/DL (ref 12–15.9)
LYMPHOCYTES # BLD MANUAL: 2.49 10*3/MM3 (ref 0.7–3.1)
LYMPHOCYTES NFR BLD MANUAL: 26 % (ref 5–12)
LYMPHOCYTES NFR BLD MANUAL: 43 % (ref 19.6–45.3)
MCH RBC QN AUTO: 29.1 PG (ref 26.6–33)
MCHC RBC AUTO-ENTMCNC: 33.1 G/DL (ref 31.5–35.7)
MCV RBC AUTO: 88 FL (ref 79–97)
MONOCYTES # BLD AUTO: 1.51 10*3/MM3 (ref 0.1–0.9)
NEUTROPHILS # BLD AUTO: 1.62 10*3/MM3 (ref 1.7–7)
NEUTROPHILS NFR BLD MANUAL: 9 % (ref 42.7–76)
NEUTS BAND NFR BLD MANUAL: 19 % (ref 0–5)
PLAT MORPH BLD: NORMAL
PLATELET # BLD AUTO: 276 10*3/MM3 (ref 140–450)
PMV BLD AUTO: 7.2 FL (ref 6–12)
RBC # BLD AUTO: 4.17 10*6/MM3 (ref 3.77–5.28)
RPR SER QL: NORMAL
SCAN SLIDE: NORMAL
WBC MORPH BLD: NORMAL
WBC NRBC COR # BLD: 5.8 10*3/MM3 (ref 3.4–10.8)

## 2019-11-01 PROCEDURE — 85007 BL SMEAR W/DIFF WBC COUNT: CPT | Performed by: NURSE PRACTITIONER

## 2019-11-01 PROCEDURE — 85025 COMPLETE CBC W/AUTO DIFF WBC: CPT | Performed by: NURSE PRACTITIONER

## 2019-11-01 RX ORDER — IBUPROFEN 600 MG/1
600 TABLET ORAL EVERY 6 HOURS PRN
Qty: 30 TABLET | Refills: 0 | Status: SHIPPED | OUTPATIENT
Start: 2019-11-01 | End: 2020-01-09

## 2019-11-01 NOTE — LACTATION NOTE
This note was copied from a baby's chart.  Pt. Visited, bilat nipples abraded, nipple skin care products in use.   Assisted to position, latch feed baby in lt cradle hold.  Breasts filling.   Teaching complete. Plans d/c home, will follow up as needed.

## 2019-11-01 NOTE — DISCHARGE SUMMARY
[facility]  Delivery Discharge Summary    Primary OB Clinician: Shiloh Oliver MD    Admission Diagnosis: TIUP; IOL for GDM  Active Problems:    * No active hospital problems. *      Discharge Diagnosis:  Same; delivered    Gestational Age: 39w0d    Date of Delivery: 10/30/2019     Delivered By:  Shiloh Oliver     Delivery Type: Vaginal, Spontaneous      Tubal Ligation: n/a    Intrapartum Course: Uncomplicated delivery.     Postpartum Course:  Pt was admitted and underwent  Spontaneous Vaginal Delivery. Pt was transferred to PP where she had an uncomplicated course. Pt remained AFVSS, had scant lochia and pain was well controlled. Pt d/c home in stable condition and will f/u in office for PP visit as scheduled or PRN. Currently breastfeeding. Pt underwent IOL for GDM. Blood sugars post delivery all in normal range. Plans on vasectomy  for contraception.     Physical Exam:    Vitals:   Vitals:    10/31/19 1153 10/31/19 1431 10/31/19 2355 19 0630   BP: 107/70 107/68 112/73 102/64   BP Location:   Left arm Left arm   Patient Position:   Sitting Lying   Pulse: 72 75 58 70   Resp: 17 16 16 17   Temp: 98.8 °F (37.1 °C) 98.3 °F (36.8 °C) 97.5 °F (36.4 °C) 98.2 °F (36.8 °C)   TempSrc: Oral Oral Oral Oral   SpO2: 96% 97% 98% 97%   Weight:       Height:         Temp (24hrs), Av.2 °F (36.8 °C), Min:97.5 °F (36.4 °C), Max:98.8 °F (37.1 °C)      General Appearance:    Alert, cooperative, in no acute distress   Abdomen:     Soft non-tender, non-distended, no guarding, no rebound         tenderness.   Extremities:   Moves all extremities well, no edema, no cyanosis, no              Redness.   Incision:  N/A   Fundus:   Firm, below umbilicus     Feeding method: Breastfeeding Status: Yes    Labs:  Results from last 7 days   Lab Units 10/30/19  0534   WBC 10*3/mm3 3.90   HEMOGLOBIN g/dL 11.6*   HEMATOCRIT % 34.1   PLATELETS 10*3/mm3 282           Blood Type: RH Positive      Plan:  Discharge to home.    Follow-up  appointment with Dr Oliver in 6 weeks.    Domi Cardoza, SHANEKA  11/1/2019  11:45 AM

## 2019-11-01 NOTE — PLAN OF CARE
Problem: Patient Care Overview  Goal: Plan of Care Review  Outcome: Ongoing (interventions implemented as appropriate)   11/01/19 0423   Coping/Psychosocial   Plan of Care Reviewed With patient   Plan of Care Review   Progress improving   OTHER   Outcome Summary breastfeeding fair, sleepy at times. no c/o pain. voiding without difficulty.       Problem: Postpartum (Vaginal Delivery) (Adult,Obstetrics,Pediatric)  Goal: Signs and Symptoms of Listed Potential Problems Will be Absent, Minimized or Managed (Postpartum)  Outcome: Ongoing (interventions implemented as appropriate)      Problem: Breastfeeding (Adult,Obstetrics,Pediatric)  Goal: Signs and Symptoms of Listed Potential Problems Will be Absent, Minimized or Managed (Breastfeeding)  Outcome: Ongoing (interventions implemented as appropriate)

## 2019-11-01 NOTE — CONSULTS
Continued Stay Note  [facility]     Patient Name: Shikha Paris  MRN: [id number]  Today's Date: 11/1/2019    Admit Date: 10/30/2019    Discharge Plan     Row Name 11/01/19 1705       Plan    Plan  MSW following as needed.     Plan Comments  SW met with patient due to EPDS score of 15. Pt reported history of anxiety/depression in her past. Pt has previously taken Zoloft prior to pregnancy, but felt it didn't help her. Pt reports if needed, she will look at starting a new medication now that infant is here. Pt verbalized she was feeling well & ready to go home. Provided education PPD risk factors, information on 24/7 helpline, & local counselors if pt decides she would like to pursue. Pt denied other needs & has supplies for infant per conversation. No further f/u needed.      SCOTT Rodriguez    Phone: [phone]  Cell: [phone]  Fax: [phone]  [email or web address]

## 2019-11-08 ENCOUNTER — HOSPITAL ENCOUNTER (OUTPATIENT)
Dept: ONCOLOGY | Facility: HOSPITAL | Age: 29
Discharge: HOME | End: 2019-11-08
Admitting: NURSE PRACTITIONER

## 2019-11-08 VITALS
HEIGHT: 62 IN | SYSTOLIC BLOOD PRESSURE: 121 MMHG | TEMPERATURE: 98.9 F | WEIGHT: 181.8 LBS | BODY MASS INDEX: 33.45 KG/M2 | RESPIRATION RATE: 18 BRPM | HEART RATE: 74 BPM | DIASTOLIC BLOOD PRESSURE: 68 MMHG

## 2019-11-08 DIAGNOSIS — E53.8 B12 DEFICIENCY: Primary | ICD-10-CM

## 2019-11-08 LAB — REF LAB TEST METHOD: NORMAL

## 2019-11-08 PROCEDURE — 25010000002 CYANOCOBALAMIN PER 1000 MCG: Performed by: INTERNAL MEDICINE

## 2019-11-08 PROCEDURE — 96372 THER/PROPH/DIAG INJ SC/IM: CPT | Performed by: INTERNAL MEDICINE

## 2019-11-08 RX ORDER — CYANOCOBALAMIN 1000 UG/ML
1000 INJECTION, SOLUTION INTRAMUSCULAR; SUBCUTANEOUS ONCE
Status: COMPLETED | OUTPATIENT
Start: 2019-11-08 | End: 2019-11-08

## 2019-11-08 RX ADMIN — CYANOCOBALAMIN 1000 MCG: 1000 INJECTION, SOLUTION INTRAMUSCULAR; SUBCUTANEOUS at 09:27

## 2019-11-15 ENCOUNTER — HOSPITAL ENCOUNTER (OUTPATIENT)
Dept: ONCOLOGY | Facility: HOSPITAL | Age: 29
Discharge: HOME | End: 2019-11-15
Admitting: NURSE PRACTITIONER

## 2019-11-15 VITALS
TEMPERATURE: 98 F | BODY MASS INDEX: 32.02 KG/M2 | WEIGHT: 174 LBS | HEIGHT: 62 IN | RESPIRATION RATE: 18 BRPM | HEART RATE: 74 BPM | SYSTOLIC BLOOD PRESSURE: 116 MMHG | DIASTOLIC BLOOD PRESSURE: 74 MMHG

## 2019-11-15 DIAGNOSIS — E53.8 B12 DEFICIENCY: Primary | ICD-10-CM

## 2019-11-15 PROCEDURE — 25010000002 CYANOCOBALAMIN PER 1000 MCG: Performed by: INTERNAL MEDICINE

## 2019-11-15 PROCEDURE — 96372 THER/PROPH/DIAG INJ SC/IM: CPT | Performed by: INTERNAL MEDICINE

## 2019-11-15 RX ORDER — CYANOCOBALAMIN 1000 UG/ML
1000 INJECTION, SOLUTION INTRAMUSCULAR; SUBCUTANEOUS ONCE
Status: COMPLETED | OUTPATIENT
Start: 2019-11-15 | End: 2019-11-15

## 2019-11-15 RX ADMIN — CYANOCOBALAMIN 1000 MCG: 1000 INJECTION, SOLUTION INTRAMUSCULAR; SUBCUTANEOUS at 11:10

## 2019-11-20 ENCOUNTER — OFFICE VISIT (OUTPATIENT)
Dept: ONCOLOGY | Facility: CLINIC | Age: 29
End: 2019-11-20

## 2019-11-20 ENCOUNTER — APPOINTMENT (OUTPATIENT)
Dept: LAB | Facility: HOSPITAL | Age: 29
End: 2019-11-20

## 2019-11-20 ENCOUNTER — HOSPITAL ENCOUNTER (OUTPATIENT)
Dept: ONCOLOGY | Facility: HOSPITAL | Age: 29
Discharge: HOME | End: 2019-11-20
Admitting: NURSE PRACTITIONER

## 2019-11-20 ENCOUNTER — APPOINTMENT (OUTPATIENT)
Dept: ONCOLOGY | Facility: CLINIC | Age: 29
End: 2019-11-20

## 2019-11-20 VITALS
RESPIRATION RATE: 20 BRPM | BODY MASS INDEX: 33.49 KG/M2 | HEIGHT: 61 IN | TEMPERATURE: 99.4 F | HEART RATE: 97 BPM | WEIGHT: 177.4 LBS | DIASTOLIC BLOOD PRESSURE: 75 MMHG | SYSTOLIC BLOOD PRESSURE: 111 MMHG

## 2019-11-20 DIAGNOSIS — E53.8 B12 DEFICIENCY: Primary | ICD-10-CM

## 2019-11-20 DIAGNOSIS — D64.9 ANEMIA, UNSPECIFIED TYPE: ICD-10-CM

## 2019-11-20 DIAGNOSIS — E61.1 IRON DEFICIENCY: ICD-10-CM

## 2019-11-20 DIAGNOSIS — D50.8 OTHER IRON DEFICIENCY ANEMIA: ICD-10-CM

## 2019-11-20 LAB
ALBUMIN SERPL-MCNC: 4.3 G/DL (ref 3.5–5.2)
ALBUMIN/GLOB SERPL: 1.4 G/DL
ALP SERPL-CCNC: 62 U/L (ref 39–117)
ALT SERPL W P-5'-P-CCNC: 18 U/L (ref 1–33)
ANION GAP SERPL CALCULATED.3IONS-SCNC: 11 MMOL/L (ref 5–15)
AST SERPL-CCNC: 19 U/L (ref 1–32)
BASOPHILS # BLD AUTO: 0.03 10*3/MM3 (ref 0–0.2)
BASOPHILS NFR BLD AUTO: 0.8 % (ref 0–1.5)
BILIRUB SERPL-MCNC: 0.3 MG/DL (ref 0.2–1.2)
BUN BLD-MCNC: 14 MG/DL (ref 6–20)
BUN/CREAT SERPL: 18.7 (ref 7–25)
CALCIUM SPEC-SCNC: 9 MG/DL (ref 8.6–10.5)
CHLORIDE SERPL-SCNC: 105 MMOL/L (ref 98–107)
CO2 SERPL-SCNC: 24 MMOL/L (ref 22–29)
CREAT BLD-MCNC: 0.75 MG/DL (ref 0.57–1)
DEPRECATED RDW RBC AUTO: 48.2 FL (ref 37–54)
EOSINOPHIL # BLD AUTO: 0.13 10*3/MM3 (ref 0–0.4)
EOSINOPHIL NFR BLD AUTO: 3.6 % (ref 0.3–6.2)
ERYTHROCYTE [DISTWIDTH] IN BLOOD BY AUTOMATED COUNT: 15.2 % (ref 12.3–15.4)
FERRITIN SERPL-MCNC: 110.7 NG/ML (ref 13–150)
GFR SERPL CREATININE-BSD FRML MDRD: 91 ML/MIN/1.73
GLOBULIN UR ELPH-MCNC: 3.1 GM/DL
GLUCOSE BLD-MCNC: 83 MG/DL (ref 65–99)
HCT VFR BLD AUTO: 41.9 % (ref 34–46.6)
HGB BLD-MCNC: 13.7 G/DL (ref 12–15.9)
IRON 24H UR-MRATE: 117 MCG/DL (ref 37–145)
IRON SATN MFR SERPL: 30 % (ref 20–50)
LYMPHOCYTES # BLD AUTO: 2.36 10*3/MM3 (ref 0.7–3.1)
LYMPHOCYTES NFR BLD AUTO: 64.7 % (ref 19.6–45.3)
MCH RBC QN AUTO: 28.6 PG (ref 26.6–33)
MCHC RBC AUTO-ENTMCNC: 32.7 G/DL (ref 31.5–35.7)
MCV RBC AUTO: 87.5 FL (ref 79–97)
MONOCYTES # BLD AUTO: 0.68 10*3/MM3 (ref 0.1–0.9)
MONOCYTES NFR BLD AUTO: 18.6 % (ref 5–12)
NEUTROPHILS # BLD AUTO: 0.45 10*3/MM3 (ref 1.7–7)
NEUTROPHILS NFR BLD AUTO: 12.3 % (ref 42.7–76)
PLATELET # BLD AUTO: 314 10*3/MM3 (ref 140–450)
PMV BLD AUTO: 8.2 FL (ref 6–12)
POTASSIUM BLD-SCNC: 4.3 MMOL/L (ref 3.5–5.2)
PROT SERPL-MCNC: 7.4 G/DL (ref 6–8.5)
RBC # BLD AUTO: 4.79 10*6/MM3 (ref 3.77–5.28)
SODIUM BLD-SCNC: 140 MMOL/L (ref 136–145)
TIBC SERPL-MCNC: 392 MCG/DL (ref 298–536)
TRANSFERRIN SERPL-MCNC: 263 MG/DL (ref 200–360)
VIT B12 BLD-MCNC: 970 PG/ML (ref 211–946)
WBC NRBC COR # BLD: 3.65 10*3/MM3 (ref 3.4–10.8)

## 2019-11-20 PROCEDURE — 82728 ASSAY OF FERRITIN: CPT | Performed by: INTERNAL MEDICINE

## 2019-11-20 PROCEDURE — 96372 THER/PROPH/DIAG INJ SC/IM: CPT | Performed by: INTERNAL MEDICINE

## 2019-11-20 PROCEDURE — 82607 VITAMIN B-12: CPT | Performed by: INTERNAL MEDICINE

## 2019-11-20 PROCEDURE — 80053 COMPREHEN METABOLIC PANEL: CPT | Performed by: INTERNAL MEDICINE

## 2019-11-20 PROCEDURE — 99214 OFFICE O/P EST MOD 30 MIN: CPT | Performed by: INTERNAL MEDICINE

## 2019-11-20 PROCEDURE — 83540 ASSAY OF IRON: CPT | Performed by: INTERNAL MEDICINE

## 2019-11-20 PROCEDURE — 84466 ASSAY OF TRANSFERRIN: CPT | Performed by: INTERNAL MEDICINE

## 2019-11-20 PROCEDURE — 25010000002 CYANOCOBALAMIN PER 1000 MCG: Performed by: INTERNAL MEDICINE

## 2019-11-20 PROCEDURE — 85025 COMPLETE CBC W/AUTO DIFF WBC: CPT | Performed by: INTERNAL MEDICINE

## 2019-11-20 RX ORDER — SODIUM CHLORIDE 9 MG/ML
250 INJECTION, SOLUTION INTRAVENOUS ONCE
Status: CANCELLED | OUTPATIENT
Start: 2019-11-27

## 2019-11-20 RX ORDER — ACYCLOVIR 800 MG/1
TABLET ORAL 2 TIMES DAILY
COMMUNITY

## 2019-11-20 RX ORDER — SODIUM CHLORIDE 9 MG/ML
250 INJECTION, SOLUTION INTRAVENOUS ONCE
Status: CANCELLED | OUTPATIENT
Start: 2019-12-04

## 2019-11-20 RX ORDER — CYANOCOBALAMIN 1000 UG/ML
1000 INJECTION, SOLUTION INTRAMUSCULAR; SUBCUTANEOUS ONCE
Status: COMPLETED | OUTPATIENT
Start: 2019-11-20 | End: 2019-11-20

## 2019-11-20 RX ADMIN — CYANOCOBALAMIN 1000 MCG: 1000 INJECTION, SOLUTION INTRAMUSCULAR; SUBCUTANEOUS at 13:30

## 2019-11-20 NOTE — PROGRESS NOTES
Hematology/Oncology Outpatient Follow Up    Shikha Paris  1990    Primary Care Physician: Shiloh Oliver MD  Referring Physician: Shiloh Oliver MD  Chief Complaint:  Chronic benign neutropenia  Vitamin B12 deficiency  History of Present Illness:   1. Chronic neutropenia diagnosis established originally in 2004.  · She claimed to have developed a sore throat and fevers five days prior to her admission at [facility] on 3/15/14.  Her temperature went up to a maximum of 103.9 and she called Dr. Wetzel about this and was told to go to the emergency room if the fever went up again, which she did.  She had preliminary work-up done and was sent home.  Her temperature, however, on day of admission went up to 102 degrees.  She called Dr. Wetzel and was admitted to the hospital.  She claimed to have had a few blisters come up on her lips and the inside of her mouth a few days prior to admission.  She had developed a dry cough which caused her to vomit.  She was found to have a white blood cell count of 3.7 with 22% segs and infectious disease consult was obtained.  She was treated with antibiotics and antivirals and hematology consultation was called.  The patient stated that she was originally diagnosed with neutropenia in 2004 when she was in the eighth grade.  She was living in [place] at that time and was worked up at [facility].  She had a bone marrow done on 2/20/04 which was normocellular containing trilateral maturation, rare iron stores, no abnormal infiltrates identified.  Flow cytometry revealed blast marker CD34 seen in 9% of gated cells and 5-6% of the total cells.  Bone marrow was repeated on 3/4/04 which revealed hypocellular marrow with erythroid predominance, 6.7% blasts, decreased neutrophils, eosinophilia and mild dyspoiesis changes of the myeloid and erythroid series.  Iron stores were absent.  The bone marrow was repeated on 3/29/04 which revealed diluted, markedly  hypocellular marrow aspirate.  Bone marrow biopsy showed mildly hypocellular marrow with adequate erythropoiesis and megakaryocytes, mild monocytes and relative eosinophilia but marked granulocytic hypoplasia.  Flow cytometry revealed a mixture of myeloid and lymphoid cells.  Fewer than 2% or cells expressed CD 34.  She was hospitalized at [facility] in July of 2009 and evaluated by Dr. Shaheen Ray.  A bone marrow biopsy was performed which revealed normal appearing megakaryocytes, evidence of normoblastic crythropoiesis and evidence of some myeloid maturation with left shift.  Flow cytometry was negative.  Cytogenetics was normal.  Peripheral blood flow cytometry was also performed which was normal.  · 3/11/4 - Chest x-ray negative.  · 3/12/14 - Flu screen negative.  · 3/15/14 - Chest x-ray revealed asymmetric abnormal small focal infiltrate in the posterior aspect of the right upper lobe of the lung suspicious for pneumonia.  · 3/16/14 - IgM 112 (N), IgA 178 (N), IgG 953 (N), EBV IgM negative, EBV IgG positive, CMV IgG normal, CMV IgM 2 (H).  · 3/17/14 - Peripheral blood flow cytometry normal.  · 4/1/14 - CXR: Complete clearing of the right upper lobe pneumonia.  The chest is return to normal.  Significant improvement from March 15, 2014.  · 4/8/14 - WBC 2.83, ANC 0.26.  · 4/8/14 - CMV IgG 0.1 (N), CMV IgM 0.42 (N).  · 7/15/14 - WBC 3.27, ANC 0.45. Comprehensive metabolic panel with serum creatinine 1.7 (0.6-1.2).   · 1/28/15 - Immunoglobulins normal. Serum creatinine 1.1 mg/dL.   · 9/24/15 - WBC 3.1 with 31% granulocytes, 51% lymphocytes, 18% monocytes, hemoglobin 12.7, platelet count 293,000. Patient has had redness to the right hip area with some pus and painful lymph node in the right groin for two days, taking Keflex through PMD. Neupogen 300 mcg subcu daily and Cipro 500 mg p.o. b.i.d. prescribed.   · 9/25/15 - Wound culture grew 2+ staphylococcus aureus MRSA susceptible to Tetracycline, Bactrium, Vancomycin.  Folate more than 24.8 (5.9-24.8). AGA negative. Vitamin B12 of 263 (180-914).   · 9/28/15 - Hip drainage culture came back positive for MRSA and Keflex and Cipro stopped. Patient started on Bactrim DS 1 p.o. q. 12 hours x14 days.   · 9/30/15 - Patient complained of sharp chest pains. EKG done revealing sinus rhythm with sinus arrhythmia. WBC increased to 9000 with Neupogen discontinued.   · 10/7/15 - WBC 2.8 with 22% granulocytes, 70% lymphocytes, 9% monocytes, hemoglobin 12.4, platelet count 213,000. Resolution of hyperemia and no more drainage from the right hip wound. Chest pains have decreased and are associated with exercise. IgA 165 (), IgG 1100 (600-1500), IgM 117 (). Chest x-ray with no acute cardiopulmonary abnormality.  (). Antineutrophil antibodies negative.   · 12/8/15 - WBC 3.1 with 34% granulocytes, 56% lymphocytes, 10% monocytes, hemoglobin 13, MCV 93.7, platelet count 258,000. Comprehensive metabolic panel with no significant abnormality. ARON screen negative.   · 12/10/15 - CT chest PE protocol with no acute cardiopulmonary abnormality.   · 2/8/16 - WBC 4 with 33% granulocytes, 55% lymphocytes, hemoglobin 13.3, platelet count 653,000. Patient claims to have had recurrent infections with upper respiratory and vaginal infections and is just finishing a course of Flagyl at present. Asked to start on Neupogen 450 mcg subq weekly x4 any time she gets an infection. HSV which showed herpes 1 antibody IgG >8.0 high (<0.9), herpes 2 antibody IgG <0.2 normal (<0.9), herpes simplex1/2 IgM <0.9 normal (<0.9).      · 6/28/16 - White count 4.02, hemoglobin 12.2, MCV 90.7 and platelet count 320,000. ANC is 570.   · 7/12/16 - Patient called the office reporting a skin infection, treated with Ciprofloxacin.   · 9/27/16 - Comprehensive metabolic panel with no significant abnormality. UA negative. Zika virus IgM negative and Zika RNA PCR IgM not detected. Blood cultures with no growth. CMV IgM  0.27 (<0.91) and CMV IgG 0.1 (<0.9). IgA 167 (), IgG 1090 (600-1500), IgM 128 ().        · 10/4/16 - Blood typing was A positive. RPR non-reactive. Hepatitis B surface antigen non-reactive.   · 11/28/16 - Patient seen by Shiloh Oliver M.D. for second trimester of first pregnancy.   · 12/22/16 - Notified to increase potassium in her diet.   · 3/20/17 - Seen in ER at [facility] for nausea, vomiting and diarrhea. Treated with 1 liter lactated Ringer’s and Zofran. UA suspicious for UTI.  · 3/21/17 - White blood cell count 3.7, ANC 0.94, hemoglobin 12.2, MCV 94.9 and platelet count 249,000. Magnesium 1.9 (1.8-2.5), potassium 3.9 (3.6-5.1), sodium 133 (136-144).      · 6/20/17 - WBC 3.1 with 9% neutrophils, 61% lymphocytes, 24% monocytes, 5% eosinophils, hemoglobin 12.9, platelet count 275,000. Flow cytometry on peripheral blood with no evidence of abnormal myeloid maturation or an increased blast population. No evidence of lymphoproliferative disorder.   · 8/2/17 - Reviewed medication list for side effects causing neutropenia.  Pepcid AC can cause pancytopenia, and leukopenia. Valtrex can cause a decreased neutrophil count.   · 1/29/19 - Discussed possible second opinion at the [facility].   · 8/27/19 - Chest x-ray showed no active disease.  · 9/19/2019 patient 33 weeks pregnant.  Has not made her trip to the [facility] yet. WBC 3.84 with 20% neutrophils, 53% lymphocytes, 25% monocytes, hemoglobin 10.5, MCV 88.9, platelet count 299,000. Iron 31 (), iron sat 6 (15-50), TIBC 542 (228-428), ferritin 7 (), folate 16.50 (5.90-24.80), haptoglobin 125 (), retic 1.91 (0.70-1.90), Vitamin B12 139 (180-914), IgG 877 (600-1,500), IgM 105 (), IgA 163 ().     · 10/1/2019 patient started on vitamin B12 1000 mcg IM weekly x8 followed by monthly and ferrous sulfate 325 mg p.o. twice daily.  ·     Past Medical History:   Diagnosis Date   • Gestational diabetes    • Neutropenia  (CMS/[facility])    • Vitamin B deficiency        Past Surgical History:   Procedure Laterality Date   • TONSILLECTOMY AND ADENOIDECTOMY  1998         Current Outpatient Medications:   •  acyclovir (ZOVIRAX) 800 MG tablet, Take  by mouth 2 (Two) Times a Day., Disp: , Rfl:   •  ibuprofen (ADVIL,MOTRIN) 600 MG tablet, Take 1 tablet by mouth Every 6 (Six) Hours As Needed for Mild Pain ., Disp: 30 tablet, Rfl: 0  •  IRON PO, Take 65 mg by mouth 2 (Two) Times a Day., Disp: , Rfl:     No Known Allergies    Family History   Problem Relation Age of Onset   • Diabetes Mother    • Diabetes Maternal Uncle        Cancer-related family history is not on file.    Social History     Tobacco Use   • Smoking status: Never Smoker   • Smokeless tobacco: Never Used   Substance Use Topics   • Alcohol use: No     Frequency: Never   • Drug use: No       I have reviewed the history of present illness, past medical history, family history, social history, lab results, all notes and other records since the patient was last seen SUBJECTIVE:      Patient feels well no recent infections.  Compliant with her B12 injections.  She had a baby delivered 3 weeks ago no complications.      ROS:      Review of Systems   Constitutional: Negative for fever.   HENT: Negative for nosebleeds and trouble swallowing.    Eyes: Negative for visual disturbance.   Respiratory: Negative for cough, shortness of breath and wheezing.    Cardiovascular: Negative for chest pain.   Gastrointestinal: Negative for abdominal pain and blood in stool.   Endocrine: Negative for cold intolerance.   Genitourinary: Negative for dysuria and hematuria.   Musculoskeletal: Negative for joint swelling.   Skin: Negative for rash.   Allergic/Immunologic: Negative for environmental allergies.   Neurological: Negative for seizures.   Hematological: Does not bruise/bleed easily.   Psychiatric/Behavioral: The patient is not nervous/anxious.          Objective:       Vitals:    11/20/19 1212   BP:  "111/75   Pulse: 97   Resp: 20   Temp: 99.4 °F (37.4 °C)   Weight: 80.5 kg (177 lb 6.4 oz)   Height: 154.9 cm (61\")   PainSc: 0-No pain         PHYSICAL EXAM:      Physical Exam   Constitutional: She is oriented to person, place, and time. No distress.   HENT:   Head: Normocephalic and atraumatic.   Eyes: Conjunctivae and EOM are normal. Right eye exhibits no discharge. Left eye exhibits no discharge. No scleral icterus.   Neck: Normal range of motion. Neck supple. No thyromegaly present.   Cardiovascular: Normal rate, regular rhythm and normal heart sounds. Exam reveals no gallop and no friction rub.   Pulmonary/Chest: Effort normal. No stridor. No respiratory distress. She has no wheezes.   Abdominal: Soft. Bowel sounds are normal. She exhibits no mass. There is no tenderness. There is no rebound and no guarding.   Musculoskeletal: Normal range of motion. She exhibits no tenderness.   Lymphadenopathy:     She has no cervical adenopathy.   Neurological: She is alert and oriented to person, place, and time. She exhibits normal muscle tone.   Skin: Skin is warm. No rash noted. She is not diaphoretic. No erythema.   Psychiatric: She has a normal mood and affect. Her behavior is normal.   Nursing note and vitals reviewed.       RECENT LABS:     WBC   Date Value Ref Range Status   11/01/2019 5.80 3.40 - 10.80 10*3/mm3 Final     RBC   Date Value Ref Range Status   11/01/2019 4.17 3.77 - 5.28 10*6/mm3 Final     Hemoglobin   Date Value Ref Range Status   11/01/2019 12.2 12.0 - 15.9 g/dL Final     Hematocrit   Date Value Ref Range Status   11/01/2019 36.7 34.0 - 46.6 % Final     MCV   Date Value Ref Range Status   11/01/2019 88.0 79.0 - 97.0 fL Final     MCH   Date Value Ref Range Status   11/01/2019 29.1 26.6 - 33.0 pg Final     MCHC   Date Value Ref Range Status   11/01/2019 33.1 31.5 - 35.7 g/dL Final     RDW   Date Value Ref Range Status   11/01/2019 17.1 (H) 12.3 - 15.4 % Final     RDW-SD   Date Value Ref Range Status "   11/01/2019 52.5 37.0 - 54.0 fl Final     MPV   Date Value Ref Range Status   11/01/2019 7.2 6.0 - 12.0 fL Final     Platelets   Date Value Ref Range Status   11/01/2019 276 140 - 450 10*3/mm3 Final     Neutrophil %   Date Value Ref Range Status   10/30/2019 14.9 (L) 42.7 - 76.0 % Final     Lymphocyte %   Date Value Ref Range Status   10/30/2019 58.1 (H) 19.6 - 45.3 % Final     Monocyte %   Date Value Ref Range Status   10/30/2019 24.6 (H) 5.0 - 12.0 % Final     Eosinophil %   Date Value Ref Range Status   10/30/2019 2.2 0.3 - 6.2 % Final     Basophil %   Date Value Ref Range Status   10/30/2019 0.2 0.0 - 1.5 % Final     Neutrophils Absolute   Date Value Ref Range Status   11/01/2019 1.62 (L) 1.70 - 7.00 10*3/mm3 Final     Neutrophils, Absolute   Date Value Ref Range Status   10/30/2019 0.60 (L) 1.70 - 7.00 10*3/mm3 Final     Lymphocytes, Absolute   Date Value Ref Range Status   10/30/2019 2.30 0.70 - 3.10 10*3/mm3 Final     Monocytes, Absolute   Date Value Ref Range Status   10/30/2019 1.00 (H) 0.10 - 0.90 10*3/mm3 Final     Eosinophils Absolute   Date Value Ref Range Status   11/01/2019 0.12 0.00 - 0.40 10*3/mm3 Final     Eosinophils, Absolute   Date Value Ref Range Status   10/30/2019 0.10 0.00 - 0.40 10*3/mm3 Final     Basophils Absolute   Date Value Ref Range Status   11/01/2019 0.06 0.00 - 0.20 10*3/mm3 Final     Basophils, Absolute   Date Value Ref Range Status   10/30/2019 0.00 0.00 - 0.20 10*3/mm3 Final     nRBC   Date Value Ref Range Status   10/30/2019 0.2 0.0 - 0.2 /100 WBC Final       Lab Results   Component Value Date    GLUCOSE 79 01/29/2019    BUN 10 01/29/2019    CREATININE 0.8 01/29/2019    BCR 12.5 01/29/2019    K 3.5 (L) 01/29/2019    CO2 23 01/29/2019    CALCIUM 9.1 01/29/2019    ALBUMIN 4.3 01/29/2019    LABIL2 1.4 01/29/2019    AST 23 01/29/2019    ALT 14 01/29/2019         Assessment/Plan      ASSESSMENT:     1. Chronic benign neutropenia  2. Vitamin B12 deficiency  3. ECOG    PLAN:       1. Vitamin B12 IM every 4 weeks  2. Follow CBC.  if patient develops infection will start her on preemptive antibiotics or antivirals along with Neupogen if needed  3. I will see her back in my office in 6 months recheck CBC at the time    I have reviewed labs results, imaging, vitals, and medications with the patient today.  Patient verbalized understanding and is in agreement of the above plan.          This report was compiled using Dragon voice recognition software. I have made every effort to proof read this document; however, typographical errors may persist.

## 2019-11-27 ENCOUNTER — TELEPHONE (OUTPATIENT)
Dept: ONCOLOGY | Facility: HOSPITAL | Age: 29
End: 2019-11-27

## 2019-11-27 ENCOUNTER — TELEPHONE (OUTPATIENT)
Dept: ONCOLOGY | Facility: CLINIC | Age: 29
End: 2019-11-27

## 2019-11-27 NOTE — TELEPHONE ENCOUNTER
left v/m to reschedule b12 patient stated last visit she may be leaving this office and seeing Dr. Marino

## 2019-11-27 NOTE — TELEPHONE ENCOUNTER
Marnie Miles, authorizations, asked if she is to proceed with johann GUTHRIE. Iron studies came back normal. Reviewed with Dr. Fregoso. Dr. Fregoso said she reviewed these results last evening and cancelled the order.

## 2019-12-18 ENCOUNTER — APPOINTMENT (OUTPATIENT)
Dept: ONCOLOGY | Facility: HOSPITAL | Age: 29
End: 2019-12-18

## 2019-12-20 ENCOUNTER — TELEPHONE (OUTPATIENT)
Dept: ONCOLOGY | Facility: CLINIC | Age: 29
End: 2019-12-20

## 2019-12-20 ENCOUNTER — APPOINTMENT (OUTPATIENT)
Dept: ONCOLOGY | Facility: HOSPITAL | Age: 29
End: 2019-12-20

## 2019-12-20 NOTE — TELEPHONE ENCOUNTER
Received vm from pt stating that she needs to reschedule her B12 injection.  Attempted to call her back and had to leave a vm.

## 2019-12-23 ENCOUNTER — OFFICE VISIT (OUTPATIENT)
Dept: PSYCHIATRY | Facility: CLINIC | Age: 29
End: 2019-12-23

## 2019-12-23 DIAGNOSIS — F33.1 MAJOR DEPRESSIVE DISORDER, RECURRENT EPISODE, MODERATE (HCC): Primary | ICD-10-CM

## 2019-12-23 DIAGNOSIS — F41.1 GAD (GENERALIZED ANXIETY DISORDER): ICD-10-CM

## 2019-12-23 PROCEDURE — 90792 PSYCH DIAG EVAL W/MED SRVCS: CPT | Performed by: PHYSICIAN ASSISTANT

## 2019-12-23 NOTE — PROGRESS NOTES
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  8 wks post-partum, depression, anxiety  Second son born, bottle feedling (also has one 2.5 yrs old) She went to counseling about 6 months ago  First experienced depression in anxiety but never say anyone until college about  Depression 6/10 at its worst but better today, 2/10  More irritable lately  Anxiety 7/10  Low energy, has chronic neutropenia x 10 yrs  No panic attacks  Supposed to go back to work at the end of January  Moved here from [place] 11 yrs ago with her mom      The following portions of the patient's history were reviewed and updated as appropriate: allergies, current medications, past family history, past medical history, past social history, past surgical history and problem list.    PAST PSYCHIATRIC HISTORY  Axis I  Affective/Bipoloar Disorder, Anxiety/Panic Disorder  Axis II  None    PAST OUTPATIENT TREATMENT  Diagnosis treated:  Affective Disorder, Anxiety/Panic Disorder  Treatment Type:  Individual Therapy, Medication Management  No hospitalizations  Prior Psychiatric Medications:  Lexapro  Zoloft, most recent but not in two years  Wellbutrin  Support Groups:  None  Sequelae Of Mental Disorder:  social isolation, emotional distress      Interval History  Improved    Side Effects  None      Past Medical History:  Past Medical History:   Diagnosis Date   • Anxiety    • Depression    • Gestational diabetes    • Neutropenia (CMS/HCC)    • Vitamin B deficiency        Social History:  Social History     Socioeconomic History   • Marital status:      Spouse name: Not on file   • Number of children: Not on file   • Years of education: Not on file   • Highest education level: Not on file    Tobacco Use   • Smoking status: Never Smoker   • Smokeless tobacco: Never Used   Substance and Sexual Activity   • Alcohol use: No     Frequency: Never   • Drug use: No   • Sexual activity: Defer       Family History:  Family History   Problem Relation Age of Onset   • Diabetes Mother    • Diabetes Maternal Uncle        Past Surgical History:  Past Surgical History:   Procedure Laterality Date   • TONSILLECTOMY AND ADENOIDECTOMY  1998       Problem List:  Patient Active Problem List   Diagnosis   • Insulin controlled gestational diabetes mellitus (GDM) in third trimester   • Chronic neutropenia (CMS/HCC)   • Anemia   • B12 deficiency   • Class 1 obesity due to excess calories without serious comorbidity with body mass index (BMI) of 34.0 to 34.9 in adult       Allergy:   No Known Allergies     Discontinued Medications:  There are no discontinued medications.    Current Medications:   Current Outpatient Medications   Medication Sig Dispense Refill   • acyclovir (ZOVIRAX) 800 MG tablet Take  by mouth 2 (Two) Times a Day.     • ibuprofen (ADVIL,MOTRIN) 600 MG tablet Take 1 tablet by mouth Every 6 (Six) Hours As Needed for Mild Pain . 30 tablet 0   • IRON PO Take 65 mg by mouth 2 (Two) Times a Day.       No current facility-administered medications for this visit.          Review of Symptoms:    Psychiatric/Behavioral: Negative for agitation, behavioral problems, confusion, decreased concentration, dysphoric mood, hallucinations, self-injury, sleep disturbance and suicidal ideas. The patient is not nervous/anxious and is not hyperactive.        Physical Exam:   not currently breastfeeding.    Mental Status Exam:   Hygiene:   good  Cooperation:  Cooperative  Eye Contact:  Good  Psychomotor Behavior:  Appropriate  Affect:  Blunted  Mood: depressed  Hopelessness: Denies  Speech:  Normal  Thought Process:  Goal directed  Thought Content:  Normal  Suicidal:  None  Homicidal:  None  Hallucinations:  None  Delusion:   None  Memory:  Intact  Orientation:  Person, Place, Time and Situation  Reliability:  good  Insight:  Good  Judgement:  Good  Impulse Control:  Good  Physical/Medical Issues:  No        PHQ-9 Depression Screening  Little interest or pleasure in doing things? 1   Feeling down, depressed, or hopeless? 3   Trouble falling or staying asleep, or sleeping too much? 3   Feeling tired or having little energy? 3   Poor appetite or overeating? 3   Feeling bad about yourself - or that you are a failure or have let yourself or your family down? 3   Trouble concentrating on things, such as reading the newspaper or watching television? 2   Moving or speaking so slowly that other people could have noticed? Or the opposite - being so fidgety or restless that you have been moving around a lot more than usual? 0   Thoughts that you would be better off dead, or of hurting yourself in some way? 0   PHQ-9 Total Score 18   If you checked off any problems, how difficult have these problems made it for you to do your work, take care of things at home, or get along with other people? Very difficult           Never smoker    I advised Shikha of the risks of tobacco use.     Lab Results:   Office Visit on 11/20/2019   Component Date Value Ref Range Status   • Vitamin B-12 11/20/2019 970* 211 - 946 pg/mL Final   • Iron 11/20/2019 117  37 - 145 mcg/dL Final   • Iron Saturation 11/20/2019 30  20 - 50 % Final   • Transferrin 11/20/2019 263  200 - 360 mg/dL Final   • TIBC 11/20/2019 392  298 - 536 mcg/dL Final   • Ferritin 11/20/2019 110.70  13.00 - 150.00 ng/mL Final   • Glucose 11/20/2019 83  65 - 99 mg/dL Final   • BUN 11/20/2019 14  6 - 20 mg/dL Final   • Creatinine 11/20/2019 0.75  0.57 - 1.00 mg/dL Final   • Sodium 11/20/2019 140  136 - 145 mmol/L Final   • Potassium 11/20/2019 4.3  3.5 - 5.2 mmol/L Final   • Chloride 11/20/2019 105  98 - 107 mmol/L Final   • CO2 11/20/2019 24.0  22.0 - 29.0 mmol/L Final   • Calcium 11/20/2019 9.0  8.6 -  10.5 mg/dL Final   • Total Protein 11/20/2019 7.4  6.0 - 8.5 g/dL Final   • Albumin 11/20/2019 4.30  3.50 - 5.20 g/dL Final   • ALT (SGPT) 11/20/2019 18  1 - 33 U/L Final   • AST (SGOT) 11/20/2019 19  1 - 32 U/L Final   • Alkaline Phosphatase 11/20/2019 62  39 - 117 U/L Final   • Total Bilirubin 11/20/2019 0.3  0.2 - 1.2 mg/dL Final   • eGFR Non African Amer 11/20/2019 91  >60 mL/min/1.73 Final   • Globulin 11/20/2019 3.1  gm/dL Final   • A/G Ratio 11/20/2019 1.4  g/dL Final   • BUN/Creatinine Ratio 11/20/2019 18.7  7.0 - 25.0 Final   • Anion Gap 11/20/2019 11.0  5.0 - 15.0 mmol/L Final   • WBC 11/20/2019 3.65  3.40 - 10.80 10*3/mm3 Final   • RBC 11/20/2019 4.79  3.77 - 5.28 10*6/mm3 Final   • Hemoglobin 11/20/2019 13.7  12.0 - 15.9 g/dL Final   • Hematocrit 11/20/2019 41.9  34.0 - 46.6 % Final   • MCV 11/20/2019 87.5  79.0 - 97.0 fL Final   • MCH 11/20/2019 28.6  26.6 - 33.0 pg Final   • MCHC 11/20/2019 32.7  31.5 - 35.7 g/dL Final   • RDW 11/20/2019 15.2  12.3 - 15.4 % Final   • RDW-SD 11/20/2019 48.2  37.0 - 54.0 fl Final   • MPV 11/20/2019 8.2  6.0 - 12.0 fL Final   • Platelets 11/20/2019 314  140 - 450 10*3/mm3 Final   • Neutrophil % 11/20/2019 12.3* 42.7 - 76.0 % Final   • Lymphocyte % 11/20/2019 64.7* 19.6 - 45.3 % Final   • Monocyte % 11/20/2019 18.6* 5.0 - 12.0 % Final   • Eosinophil % 11/20/2019 3.6  0.3 - 6.2 % Final   • Basophil % 11/20/2019 0.8  0.0 - 1.5 % Final   • Neutrophils, Absolute 11/20/2019 0.45* 1.70 - 7.00 10*3/mm3 Final   • Lymphocytes, Absolute 11/20/2019 2.36  0.70 - 3.10 10*3/mm3 Final   • Monocytes, Absolute 11/20/2019 0.68  0.10 - 0.90 10*3/mm3 Final   • Eosinophils, Absolute 11/20/2019 0.13  0.00 - 0.40 10*3/mm3 Final   • Basophils, Absolute 11/20/2019 0.03  0.00 - 0.20 10*3/mm3 Final   Admission on 10/30/2019, Discharged on 11/01/2019   Component Date Value Ref Range Status   • ABO Type 10/30/2019 A   Final   • RH type 10/30/2019 Positive   Final   • Antibody Screen 10/30/2019  Negative   Final   • T&S Expiration Date 10/30/2019 11/2/2019 11:59:59 PM   Final   • WBC 10/30/2019 3.90  3.40 - 10.80 10*3/mm3 Final   • RBC 10/30/2019 3.89  3.77 - 5.28 10*6/mm3 Final   • Hemoglobin 10/30/2019 11.6* 12.0 - 15.9 g/dL Final   • Hematocrit 10/30/2019 34.1  34.0 - 46.6 % Final   • MCV 10/30/2019 87.9  79.0 - 97.0 fL Final   • MCH 10/30/2019 29.7  26.6 - 33.0 pg Final   • MCHC 10/30/2019 33.8  31.5 - 35.7 g/dL Final   • RDW 10/30/2019 17.2* 12.3 - 15.4 % Final   • RDW-SD 10/30/2019 52.9  37.0 - 54.0 fl Final   • MPV 10/30/2019 7.0  6.0 - 12.0 fL Final   • Platelets 10/30/2019 282  140 - 450 10*3/mm3 Final   • Neutrophil % 10/30/2019 14.9* 42.7 - 76.0 % Final   • Lymphocyte % 10/30/2019 58.1* 19.6 - 45.3 % Final   • Monocyte % 10/30/2019 24.6* 5.0 - 12.0 % Final   • Eosinophil % 10/30/2019 2.2  0.3 - 6.2 % Final   • Basophil % 10/30/2019 0.2  0.0 - 1.5 % Final   • Neutrophils, Absolute 10/30/2019 0.60* 1.70 - 7.00 10*3/mm3 Final   • Lymphocytes, Absolute 10/30/2019 2.30  0.70 - 3.10 10*3/mm3 Final   • Monocytes, Absolute 10/30/2019 1.00* 0.10 - 0.90 10*3/mm3 Final   • Eosinophils, Absolute 10/30/2019 0.10  0.00 - 0.40 10*3/mm3 Final   • Basophils, Absolute 10/30/2019 0.00  0.00 - 0.20 10*3/mm3 Final   • nRBC 10/30/2019 0.2  0.0 - 0.2 /100 WBC Final   • RPR 10/30/2019 Non-Reactive  Non-Reactive Final   • Glucose 10/30/2019 118* 70 - 105 mg/dL Final    Serial Number: [id number]:  [id number]   • External Strep Group B Ag 10/09/2019 Negative   Final   • External Hepatitis C Ab 03/20/2019 negative   Final   • External Hepatitis B Surface Ag 03/20/2019 Negative   Final   • External RPR 03/20/2019 Non-Reactive   Final   • External Rubella Qual 03/20/2019 Immune   Final   • External HIV Antibody 03/20/2019 Non-Reactive   Final   • HIV-1/ HIV-2 10/30/2019 Non-Reactive  Non-Reactive Final    A non-reactive test result does not preclude the possibility of exposure to HIV or infection with HIV. An antibody  response to recent exposure may take several months to reach detectable levels.   • Glucose 10/30/2019 130* 70 - 105 mg/dL Final    Serial Number: [id number]:  [id number]   • Glucose 10/30/2019 119* 70 - 105 mg/dL Final    Serial Number: [id number]:  [id number]   • Glucose 10/30/2019 71  70 - 105 mg/dL Final    Serial Number: [id number]:  [id number]   • Glucose 10/30/2019 62* 70 - 105 mg/dL Final    Serial Number: [id number]:  [id number]   • Glucose 10/30/2019 80  70 - 105 mg/dL Final    Serial Number: [id number]:  [id number]   • WBC 11/01/2019 5.80  3.40 - 10.80 10*3/mm3 Final   • RBC 11/01/2019 4.17  3.77 - 5.28 10*6/mm3 Final   • Hemoglobin 11/01/2019 12.2  12.0 - 15.9 g/dL Final   • Hematocrit 11/01/2019 36.7  34.0 - 46.6 % Final   • MCV 11/01/2019 88.0  79.0 - 97.0 fL Final   • MCH 11/01/2019 29.1  26.6 - 33.0 pg Final   • MCHC 11/01/2019 33.1  31.5 - 35.7 g/dL Final   • RDW 11/01/2019 17.1* 12.3 - 15.4 % Final   • RDW-SD 11/01/2019 52.5  37.0 - 54.0 fl Final   • MPV 11/01/2019 7.2  6.0 - 12.0 fL Final   • Platelets 11/01/2019 276  140 - 450 10*3/mm3 Final   • Scan Slide 11/01/2019    Final    See Manual Differential Results   • Neutrophil % 11/01/2019 9.0* 42.7 - 76.0 % Final   • Lymphocyte % 11/01/2019 43.0  19.6 - 45.3 % Final   • Monocyte % 11/01/2019 26.0* 5.0 - 12.0 % Final   • Eosinophil % 11/01/2019 2.0  0.3 - 6.2 % Final   • Basophil % 11/01/2019 1.0  0.0 - 1.5 % Final   • Bands %  11/01/2019 19.0* 0.0 - 5.0 % Final   • Neutrophils Absolute 11/01/2019 1.62* 1.70 - 7.00 10*3/mm3 Final   • Lymphocytes Absolute 11/01/2019 2.49  0.70 - 3.10 10*3/mm3 Final   • Monocytes Absolute 11/01/2019 1.51* 0.10 - 0.90 10*3/mm3 Final   • Eosinophils Absolute 11/01/2019 0.12  0.00 - 0.40 10*3/mm3 Final   • Basophils Absolute 11/01/2019 0.06  0.00 - 0.20 10*3/mm3 Final   • Anisocytosis 11/01/2019 Slight/1+  None Seen Final   • WBC Morphology 11/01/2019 Normal  Normal Final   • Platelet  Morphology 11/01/2019 Normal  Normal Final   Office Visit on 10/24/2019   Component Date Value Ref Range Status   • Glucose 10/24/2019 93  70 - 130 mg/dL Final   Office Visit on 10/16/2019   Component Date Value Ref Range Status   • Intrinsic Factor Antibodies 10/16/2019 1.0  0.0 - 1.1 AU/mL Final   • Reference Lab Report 10/16/2019 see scanned labs   Final   • WBC 10/16/2019 4.08  3.40 - 10.80 10*3/mm3 Final   • RBC 10/16/2019 3.99  3.77 - 5.28 10*6/mm3 Final   • Hemoglobin 10/16/2019 11.4* 12.0 - 15.9 g/dL Final   • Hematocrit 10/16/2019 35.2  34.0 - 46.6 % Final   • MCV 10/16/2019 88.2  79.0 - 97.0 fL Final   • MCH 10/16/2019 28.6  26.6 - 33.0 pg Final   • MCHC 10/16/2019 32.4  31.5 - 35.7 g/dL Final   • RDW 10/16/2019 15.4  12.3 - 15.4 % Final   • RDW-SD 10/16/2019 48.8  37.0 - 54.0 fl Final   • MPV 10/16/2019 8.6  6.0 - 12.0 fL Final   • Platelets 10/16/2019 249  140 - 450 10*3/mm3 Final   • Neutrophil % 10/16/2019 13.5* 42.7 - 76.0 % Final   • Lymphocyte % 10/16/2019 58.1* 19.6 - 45.3 % Final   • Monocyte % 10/16/2019 26.7* 5.0 - 12.0 % Final   • Eosinophil % 10/16/2019 1.2  0.3 - 6.2 % Final   • Basophil % 10/16/2019 0.5  0.0 - 1.5 % Final   • Neutrophils, Absolute 10/16/2019 0.55* 1.70 - 7.00 10*3/mm3 Final   • Lymphocytes, Absolute 10/16/2019 2.37  0.70 - 3.10 10*3/mm3 Final   • Monocytes, Absolute 10/16/2019 1.09* 0.10 - 0.90 10*3/mm3 Final   • Eosinophils, Absolute 10/16/2019 0.05  0.00 - 0.40 10*3/mm3 Final   • Basophils, Absolute 10/16/2019 0.02  0.00 - 0.20 10*3/mm3 Final   Lab on 10/09/2019   Component Date Value Ref Range Status   • Hemoglobin A1C 10/09/2019 5.5  3.5 - 5.6 % Final       Assessment/Plan   Problems Addressed this Visit     None      Visit Diagnoses     Major depressive disorder, recurrent episode, moderate (CMS/[facility])    -  Primary    ARLEY (generalized anxiety disorder)              Visit Diagnoses:    ICD-10-CM ICD-9-CM   1. Major depressive disorder, recurrent episode, moderate  (CMS/[facility]) F33.1 296.32   2. ARLEY (generalized anxiety disorder) F41.1 300.02       TREATMENT PLAN/GOALS: Continue supportive psychotherapy efforts and medications as indicated. Treatment and medication options discussed during today's visit. Patient ackowledged and verbally consented to continue with current treatment plan and was educated on the importance of compliance with treatment and follow-up appointments.    MEDICATION ISSUES:  INSPECT reviewed as expected  Discussed medication options and treatment plan of prescribed medication as well as the risks, benefits, and side effects including potential falls, possible impaired driving and metabolic adversities among others. Patient is agreeable to call the office with any worsening of symptoms or onset of side effects. Patient is agreeable to call 911 or go to the nearest ER should he/she begin having SI/HI. No medication side effects or related complaints today.     Genesight testing was done to help evaluate the best practice medications for her.  I will call her to discuss the results within the next couple of weeks when the results are back and we will send in prescriptions at that time to get her started on an antidepressant and anxiety medicine.    MEDS ORDERED DURING VISIT:  No orders of the defined types were placed in this encounter.      Return in about 3 months (around 3/23/2020).         This document has been electronically signed by Priyanka Estrella PA-C  December 23, 2019 11:48 AM

## 2020-01-09 ENCOUNTER — DOCUMENTATION (OUTPATIENT)
Dept: PSYCHIATRY | Facility: CLINIC | Age: 30
End: 2020-01-09

## 2020-01-09 DIAGNOSIS — F41.1 GAD (GENERALIZED ANXIETY DISORDER): ICD-10-CM

## 2020-01-09 DIAGNOSIS — F33.1 MAJOR DEPRESSIVE DISORDER, RECURRENT EPISODE, MODERATE (HCC): Primary | ICD-10-CM

## 2020-01-09 RX ORDER — FLUOXETINE HYDROCHLORIDE 20 MG/1
20 CAPSULE ORAL DAILY
Qty: 30 CAPSULE | Refills: 2 | Status: SHIPPED | OUTPATIENT
Start: 2020-01-09 | End: 2020-03-28 | Stop reason: DRUGHIGH

## 2020-01-09 NOTE — PROGRESS NOTES
Called patient to discuss Storrz results.  Advised her that we started on Prozac 20 mg daily.  She has taken Lexapro, Zoloft and Wellbutrin in the past and all of those head Gene to drug interactions.  She will start taking the Prozac daily and follow-up in March as scheduled and will increase the dosage at that time if needed and add something else for the anxiety if needed.

## 2020-01-28 ENCOUNTER — LAB (OUTPATIENT)
Dept: LAB | Facility: HOSPITAL | Age: 30
End: 2020-01-28

## 2020-01-28 DIAGNOSIS — O24.414 INSULIN CONTROLLED GESTATIONAL DIABETES MELLITUS (GDM) IN THIRD TRIMESTER: ICD-10-CM

## 2020-01-28 LAB — HBA1C MFR BLD: 4.9 % (ref 3.5–5.6)

## 2020-01-28 PROCEDURE — 83036 HEMOGLOBIN GLYCOSYLATED A1C: CPT

## 2020-01-28 PROCEDURE — 36415 COLL VENOUS BLD VENIPUNCTURE: CPT

## 2020-01-31 ENCOUNTER — OFFICE VISIT (OUTPATIENT)
Dept: ENDOCRINOLOGY | Facility: CLINIC | Age: 30
End: 2020-01-31

## 2020-01-31 VITALS
BODY MASS INDEX: 33.61 KG/M2 | HEART RATE: 65 BPM | DIASTOLIC BLOOD PRESSURE: 70 MMHG | HEIGHT: 61 IN | SYSTOLIC BLOOD PRESSURE: 120 MMHG | WEIGHT: 178 LBS | OXYGEN SATURATION: 96 %

## 2020-01-31 DIAGNOSIS — Z86.32 HISTORY OF GESTATIONAL DIABETES: Primary | ICD-10-CM

## 2020-01-31 PROCEDURE — 99212 OFFICE O/P EST SF 10 MIN: CPT | Performed by: INTERNAL MEDICINE

## 2020-01-31 RX ORDER — PANTOPRAZOLE SODIUM 40 MG/1
40 TABLET, DELAYED RELEASE ORAL DAILY
COMMUNITY

## 2020-01-31 NOTE — PROGRESS NOTES
Endocrine Progress Note Outpatient     Patient Care Team:  Shiloh Oliver MD as PCP - General (Obstetrics and Gynecology)  Candice Echols APRN as PCP - Family Medicine    Chief Complaint: Follow-up gestational diabetes    HPI: 29-year-old female with history of gestational diabetes status post delivery on October 30, 2019.  Baby was 8 pounds and 1 ounce.  She required Levemir insulin during the pregnancy.  Not breast-feeding.  Trying to follow the diet that she learned during the pregnancy.  Her prepregnancy weight was about 155 pounds.    Past Medical History:   Diagnosis Date   • Anxiety    • Depression    • Gestational diabetes    • Neutropenia (CMS/HCC)    • Vitamin B deficiency        Social History     Socioeconomic History   • Marital status:      Spouse name: Not on file   • Number of children: Not on file   • Years of education: Not on file   • Highest education level: Not on file   Tobacco Use   • Smoking status: Never Smoker   • Smokeless tobacco: Never Used   Substance and Sexual Activity   • Alcohol use: No     Frequency: Never   • Drug use: No   • Sexual activity: Defer       Family History   Problem Relation Age of Onset   • Diabetes Mother    • Diabetes Maternal Uncle        No Known Allergies    ROS:   Constitutional:  Denies fatigue, tiredness.    Eyes:  Denies change in visual acuity   HENT:  Denies nasal congestion or sore throat   Respiratory: denies cough, shortness of breath.   Cardiovascular:  denies chest pain, edema   GI:  Denies abdominal pain, nausea, vomiting.   Musculoskeletal:  Denies back pain or joint pain   Integument:  Denies dry skin and rash   Neurologic:  Denies headache, focal weakness or sensory changes   Endocrine:  Denies polyuria or polydipsia   Psychiatric:  Denies depression or anxiety      Vitals:    01/31/20 1024   BP: 120/70   Pulse: 65   SpO2: 96%       Physical Exam:  GEN: NAD, conversant  EYES: EOMI, PERRL, no conjunctival erythema  NECK: no  thyromegaly, full ROM   CV: RRR, no murmurs/rubs/gallops, no peripheral edema  LUNG: CTAB, no wheezes/rales/ronchi  SKIN: no rashes, no acanthosis  MSK: no deformities, full ROM of all extremities  NEURO: no tremors, DTR normal  PSYCH: AOX3, appropriate mood, affect normal      Results Review:     I reviewed the patient's new clinical results.    Lab Results   Component Value Date    HGBA1C 4.9 01/28/2020    HGBA1C 5.5 10/09/2019    HGBA1C 5.8 (H) 09/05/2019      Lab Results   Component Value Date    GLUCOSE 83 11/20/2019    BUN 14 11/20/2019    CREATININE 0.75 11/20/2019    EGFRIFNONA 91 11/20/2019    BCR 18.7 11/20/2019    K 4.3 11/20/2019    CO2 24.0 11/20/2019    CALCIUM 9.0 11/20/2019    ALBUMIN 4.30 11/20/2019    LABIL2 1.4 01/29/2019    AST 19 11/20/2019    ALT 18 11/20/2019       Medication Review: Reviewed.       Current Outpatient Medications:   •  acyclovir (ZOVIRAX) 800 MG tablet, Take  by mouth 2 (Two) Times a Day., Disp: , Rfl:   •  FLUoxetine (PROzac) 20 MG capsule, Take 1 capsule by mouth Daily., Disp: 30 capsule, Rfl: 2  •  IRON PO, Take 65 mg by mouth 2 (Two) Times a Day., Disp: , Rfl:   •  pantoprazole (PROTONIX) 40 MG EC tablet, Take 40 mg by mouth Daily., Disp: , Rfl:       Assessment/Plan   Gestational diabetes: Status post delivery on October 30, 2019.  She currently weighs 178 pounds.  Prepregnancy weight was 155 pound.  Her first target is to get to the prepregnancy weight.  She is following her diet that she learned during the pregnancy.  She is not breast-feeding.  She is advised that her risk of developing type 2 diabetes is about 50% at 5 years.  She does not have any plans for pregnancy at this time.  He is advised that if you become pregnant in the future then she needs to start looking at your sugars right away and we will be happy to see her then.  We will see her on as-needed basis.  Her A1c is normal at 4.9%.  Vies to follow annual glucose check with her family physician.             MD CAROLE Boyer.    Much of the above report is an electronic transcription/translation of the spoken language to printed text using Dragon Software. As such, the subtleties and finesse of the spoken language may permit erroneous, or at times, nonsensical words or phrases to be inadvertently transcribed; thus changes may be made at a later date to rectify these errors.

## 2020-01-31 NOTE — PATIENT INSTRUCTIONS
Please continue to work on your diet and activity and try to lose weight.  Your first target is 155 pounds and the second target is 140 pounds.  If you become pregnant in the future please notify us  Please continue to follow with your family doctor for an annual glucose check.

## 2020-03-27 ENCOUNTER — TELEPHONE (OUTPATIENT)
Dept: PSYCHIATRY | Facility: CLINIC | Age: 30
End: 2020-03-27

## 2020-03-27 NOTE — TELEPHONE ENCOUNTER
Pt called stated Prozac is working-- her appt was moved out till June now-- wanted to know if you would increase her Prozac 20mg  Without seeing her first, since it has helped-- she currently takes it once a day--- Pharmacy is [facility] [phone]--- pt call back #  is [phone]

## 2020-03-27 NOTE — TELEPHONE ENCOUNTER
Yes, tell her when I am at the hospital tomorrow working I will send in a new prescription for the higher dosage. In the meantime she can start taking to daily of the 10 mg. Please send a note back to me to remind me for tomorrow after you speak with her.

## 2020-03-27 NOTE — TELEPHONE ENCOUNTER
Per Provider Estrella the patient should increased the prozac 20 mg to Prozac 40 mg patient notified to start taking 2 tab of prozac 20 mg and a new Rx will be sent to her pharmacy

## 2020-03-28 RX ORDER — FLUOXETINE HYDROCHLORIDE 40 MG/1
40 CAPSULE ORAL DAILY
Qty: 90 CAPSULE | Refills: 1 | Status: SHIPPED | OUTPATIENT
Start: 2020-03-28 | End: 2020-09-21

## 2020-09-21 RX ORDER — FLUOXETINE HYDROCHLORIDE 40 MG/1
40 CAPSULE ORAL DAILY
Qty: 90 CAPSULE | Refills: 0 | Status: SHIPPED | OUTPATIENT
Start: 2020-09-21 | End: 2021-09-21